# Patient Record
Sex: MALE | Race: BLACK OR AFRICAN AMERICAN | Employment: OTHER | URBAN - METROPOLITAN AREA
[De-identification: names, ages, dates, MRNs, and addresses within clinical notes are randomized per-mention and may not be internally consistent; named-entity substitution may affect disease eponyms.]

---

## 2018-01-05 ENCOUNTER — OFFICE VISIT (OUTPATIENT)
Dept: NEUROLOGY | Age: 78
End: 2018-01-05

## 2018-01-05 VITALS
BODY MASS INDEX: 32.93 KG/M2 | SYSTOLIC BLOOD PRESSURE: 150 MMHG | RESPIRATION RATE: 20 BRPM | WEIGHT: 230 LBS | DIASTOLIC BLOOD PRESSURE: 68 MMHG | HEIGHT: 70 IN

## 2018-01-05 DIAGNOSIS — R20.9 SENSORY DISTURBANCE: ICD-10-CM

## 2018-01-05 DIAGNOSIS — I69.30 ARTERIAL ISCHEMIC STROKE, VERTEBROBASILAR, THALAMIC, CHRONIC: Primary | ICD-10-CM

## 2018-01-05 DIAGNOSIS — R29.898 LEFT HAND WEAKNESS: ICD-10-CM

## 2018-01-05 DIAGNOSIS — R25.9 ABNORMAL INVOLUNTARY MOVEMENT: ICD-10-CM

## 2018-01-05 RX ORDER — OLMESARTAN MEDOXOMIL / AMLODIPINE BESYLATE / HYDROCHLOROTHIAZIDE 40; 10; 25 MG/1; MG/1; MG/1
TABLET, FILM COATED ORAL
Refills: 1 | COMMUNITY
Start: 2017-10-24

## 2018-01-05 RX ORDER — SIMVASTATIN 40 MG/1
TABLET, FILM COATED ORAL
COMMUNITY

## 2018-01-05 RX ORDER — TAMSULOSIN HYDROCHLORIDE 0.4 MG/1
0.4 CAPSULE ORAL DAILY
COMMUNITY

## 2018-01-05 RX ORDER — CLOPIDOGREL BISULFATE 75 MG/1
TABLET ORAL
COMMUNITY

## 2018-01-05 NOTE — PROGRESS NOTES
[address] [facility] [address]   [place] [address] [facility] [address] [place] [address] [address]    [place]   [phone] Fax             Referring: Dr. Ciro Goyal    Chief Complaint   Patient presents with   [facility] Tremors     18-year-old right-handed man who comes today for evaluation of what he calls acute stroke tremors twitching over entire body. Patient notes that he has had for several years now abnormal movements about his extremities that move up his body that occur randomly. After his stroke he became more concerned about these. He tells me that these occur every 2-3 days and that really is what brought him here for evaluation is that they typically would have been few and far between but now at least every 2-3 days he will get these abnormal twitching type movements that start in his legs and then move up his body and come over him. Sometimes he has a strains hard to describe sensation that occurs with them as well. He denies any rhythmic movement. Denies any loss or alteration in consciousness. Denies any version of the head. Denies lipsmacking or alteration in consciousness. He says these are quick and fast.  The not repetitive. They can happen several times in the day when they happen. He will try to get up and move around to see if it makes them go away. They do not seem to start right to left her bottom to top or top to bottom no rhyme or reason. He notes they just happen. In addition he says since the stroke he has had heaviness in the left upper extremity. He notes that the strength he believes is good in the arm but his hand he has difficulty doing fine motor movements. He also says that the entire upper extremity feels like he has led inside of it instead of blood. He is not dropping things of any degree. Again more difficult to do activities that require fine control.     Review of records that were kindly provided by Dr. Ciro Goyal finds he was hospitalized in September 2017 with stroke. This was said to be right thalamic. Echo demonstrated a PFO. His presentation was said to be left arm weakness tingling and left facial tingling. He was placed on aspirin Plavix and statin therapy. Review of the electronic medical record finds that the patient saw Dr. Gucci Colon for a neuropsychological evaluation back in 2013. At that time it was felt that he had perhaps mild cognitive impairment. He also had been seen by  who had performed an EMG of the upper extremities for complaints of paresthesia. Past Medical History:   Diagnosis Date    COPD [facility]chronic obstructive pulmonary disease) ([facility] [address]     Diabetes mellitus     Essential hypertension     Headache(784.0)     Sarcoid     Stroke ([facility] [address]        Past Surgical History:   Procedure Laterality Date    HX MOHS PROCEDURES      left       Current Outpatient Prescriptions   Medication Sig Dispense Refill    simvastatin (ZOCOR) 40 mg tablet Take  by mouth nightly.  clopidogrel (PLAVIX) 75 mg tab Take  by mouth.  tamsulosin (FLOMAX) 0.4 mg capsule Take 0.4 mg by mouth daily.  DOCOSAHEXANOIC ACID/EPA (FISH OIL PO) Take  by mouth.  TRIBENZOR 40-10-25 mg tab TAKE 1 TABLET BY MOUTH EVERY DAY  1    aspirin delayed-release 81 mg tablet Take  by mouth daily. No Known Allergies    Social History   Substance Use Topics    Smoking status: Never Smoker    Smokeless tobacco: Never Used    Alcohol use No       Family History   Problem Relation Age of Onset    Seizures Brother     Heart Disease Brother     Cancer Brother      x 2, prostate    Stroke Mother     Cancer Father        Review of Systems  Pertinent positives and negatives are as noted above. He also endorses some anxiety as well as some constipation and generalized fatigue. He denies snoring. He denies awakening himself from sleep gasping for breath. Denies pulsatile tinnitus.   Remainder of comprehensive systems review is negative    Examination  Visit Vitals    /68    Resp 20    Ht 5' 10\" (1.778 m)    Wt 104.3 kg (230 lb)    BMI 33 kg/m2     Pleasant, well appearing gentleman who is appropriately dressed and groomed. He is overweight. .  No icterus. Oropharynx clear. Supple neck without bruit. Heart regular. No edema. Neurologically he is awake alert oriented and conversant. He has normal speech and language. He discusses his medical history. He discusses his medications. He follows all commands. He has reactive pupils bilaterally. Disc margins not well seen. Full versions. No nystagmus. Symmetric face. Tongue and palate midline. Age-related paratonia. No abnormal movement noted today including no fasciculation. He has no pronation or drift. Age-related paratonia. He resists fully in the upper and lower extremities in all muscle groups to direct testing with the exception of fine motor movement/dexterity in the left hand decreased versus right. .  Reflexes are symmetrical today. No ataxia. Steady gait. Impression/Plan  Status post right thalamic stroke with left-sided sensory symptoms and some decreased dexterity in the left upper extremity as noted. We discussed stroke risk factors including hypertension dyslipidemia and discuss modifying such. Discussed losing weight and exercising etc.  Note his blood pressure today is elevated 150/68 and he notes that it is not generally that high. discussed been compliant with his medications. Continue to work with primary care to optimize his risk factors. He does note that he had a carotid Doppler done with his cardiologist recently as well. I would continue the Plavix and aspirin as well as a statin. For the decreased dexterity as well we will send him over to physical therapy    His bigger issue is that of these abnormal sensations and abnormal involuntary movements and we will send him for a routine EEG and then a 24 hour ambulatory EEG.   I like to look for epileptiform abnormalities. Like to try to capture what goes on when he has 1 of these events. He will return at the completion of his studies. Alex Becerra MD      This note was created using voice recognition software. Despite editing, there may be syntax errors. This note will not be viewable in [address]

## 2018-01-05 NOTE — MR AVS SNAPSHOT
Visit Information Date & Time Provider Department Dept. Phone Encounter #  
 1/5/2018  1:30 PM Monik Sanz MD [facility] [phone] [id number] Follow-up Instructions Return for After tests. Upcoming Health Maintenance Date Due DTaP/Tdap/Td series (1 - Tdap) 3/27/1962 ZOSTER VACCINE AGE 60> 1/27/2001 GLAUCOMA SCREENING Q2Y 3/27/2006 Pneumococcal 65+ Low/Medium Risk (1 of 2 - PCV13) 3/27/2006 MEDICARE YEARLY EXAM 3/27/2006 Influenza Age 5 to Adult 8/1/2017 Allergies as of 1/5/2018  Review Complete On: 1/5/2018 By: Monik Sanz MD  
 No Known Allergies Current Immunizations  Never Reviewed No immunizations on file. Not reviewed this visit You Were Diagnosed With   
  
 Codes Comments Arterial ischemic stroke, vertebrobasilar, thalamic, chronic    -  Primary ICD-10-CM: I69.30 ICD-9-CM: V12.54 Left hand weakness     ICD-10-CM: R29.898 ICD-9-CM: 728.87 Abnormal involuntary movement     ICD-10-CM: R25.9 ICD-9-CM: 781.0 Sensory disturbance     ICD-10-CM: R20.9 ICD-9-CM: 785. 0 Vitals BP Resp Height(growth percentile) Weight(growth percentile) BMI Smoking Status 150/68 20 5' 10\" (1.778 m) 230 lb (104.3 kg) 33 kg/m2 Never Smoker Vitals History BMI and BSA Data Body Mass Index Body Surface Area  
 33 kg/m 2 2.27 m 2 Your Updated Medication List  
  
   
This list is accurate as of: 1/5/18  2:16 PM.  Always use your most recent med list.  
  
  
  
  
 aspirin delayed-release 81 mg tablet Take  by mouth daily. clopidogrel 75 mg Tab Commonly known as:  PLAVIX Take  by mouth. FISH OIL PO Take  by mouth. FLOMAX 0.4 mg capsule Generic drug:  tamsulosin Take 0.4 mg by mouth daily. simvastatin 40 mg tablet Commonly known as:  ZOCOR Take  by mouth nightly. TRIBENZOR 40-10-25 mg Tab Generic drug:  Olmesartan-amLODIPine-HCTZ TAKE 1 TABLET BY MOUTH EVERY DAY We Performed the Following REFERRAL TO PHYSICAL THERAPY [LEJ54 Custom] Comments:  
 Post CVA right thalamus--has decreased fine motor/dexterity left UE eval and treat and give HEP Follow-up Instructions Return for After tests. To-Do List   
 01/05/2018 Neurology:  EEG   
  
 01/05/2018 Neurology:  NEURO EEG 24 HR Referral Information Referral ID Referred By Referred To  
  
 [id number] [facility], [address] Phone: [phone] Visits Status Start Date End Date 1 New Request 1/5/18 1/5/19 If your referral has a status of pending review or denied, additional information will be sent to support the outcome of this decision. Patient Instructions [address] What is a living will? A living will is a legal form you use to write down the kind of care you want at the end of your life. It is used by the health professionals who will treat you if you aren't able to decide for yourself. If you put your wishes in writing, your loved ones and others will know what kind of care you want. They won't need to guess. This can ease your mind and be helpful to others. A living will is not the same as an estate or property will. An estate will explains what you want to happen with your money and property after you die. Is a living will a legal document? A living will is a legal document. Each state has its own laws about living hernandez. If you move to another state, make sure that your living will is legal in the state where you now live. Or you might use a universal form that has been approved by many states. This kind of form can sometimes be completed and stored online. Your electronic copy will then be available wherever you have a connection to the Internet.  In most cases, doctors will respect your wishes even if you have a form from a different state. · You don't need an  to complete a living will. But legal advice can be helpful if your state's laws are unclear, your health history is complicated, or your family can't agree on what should be in your living will. · You can change your living will at any time. Some people find that their wishes about end-of-life care change as their health changes. · In addition to making a living will, think about completing a medical power of  form. This form lets you name the person you want to make end-of-life treatment decisions for you (your \"health care agent\") if you're not able to. Many hospitals and nursing homes will give you the forms you need to complete a living will and a medical power of . · Your living will is used only if you can't make or communicate decisions for yourself anymore. If you become able to make decisions again, you can accept or refuse any treatment, no matter what you wrote in your living will. · Your state may offer an online registry. This is a place where you can store your living will online so the doctors and nurses who need to treat you can find it right away. What should you think about when creating a living will? Talk about your end-of-life wishes with your family members and your doctor. Let them know what you want. That way the people making decisions for you won't be surprised by your choices. Think about these questions as you make your living will: · Do you know enough about life support methods that might be used? If not, talk to your doctor so you know what might be done if you can't breathe on your own, your heart stops, or you're unable to swallow. · What things would you still want to be able to do after you receive life-support methods? Would you want to be able to walk? To speak? To eat on your own? To live without the help of machines? · If you have a choice, where do you want to be cared for? In your home? At a hospital or nursing home? · Do you want certain Zoroastrianism practices performed if you become very ill? · If you have a choice at the end of your life, where would you prefer to die? At home? In a hospital or nursing home? Somewhere else? · Would you prefer to be buried or cremated? · Do you want your organs to be donated after you die? What should you do with your living will? · Make sure that your family members and your health care agent have copies of your living will. · Give your doctor a copy of your living will to keep in your medical record. If you have more than one doctor, make sure that each one has a copy. · You may want to put a copy of your living will where it can be easily found. Where can you learn more? Go to [email or web address]-anna [email or web address].info/. Enter C626 in the search box to learn more about \"Learning About Living Miriam. \" Current as of: September 24, 2016 Content Version: 11.4 © 9764-5832 [employer]. Care instructions adapted under license by [employer] (which disclaims liability or warranty for this information). If you have questions about a medical condition or this instruction, always ask your healthcare professional. [address] any warranty or liability for your use of this information. Advance Directives: Care Instructions Your Care Instructions An advance directive is a legal way to state your wishes at the end of your life. It tells your family and your doctor what to do if you can no longer say what you want. There are two main types of advance directives. You can change them any time that your wishes change. · A living will tells your family and your doctor your wishes about life support and other treatment.  
· A durable power of  for health care lets you name a person to make treatment decisions for you when you can't speak for yourself. This person is called a health care agent. If you do not have an advance directive, decisions about your medical care may be made by a doctor or a  who doesn't know you. It may help to think of an advance directive as a gift to the people who care for you. If you have one, they won't have to make tough decisions by themselves. Follow-up care is a key part of your treatment and safety. Be sure to make and go to all appointments, and call your doctor if you are having problems. It's also a good idea to know your test results and keep a list of the medicines you take. How can you care for yourself at home? · Discuss your wishes with your loved ones and your doctor. This way, there are no surprises. · Many states have a unique form. Or you might use a universal form that has been approved by many states. This kind of form can sometimes be completed and stored online. Your electronic copy will then be available wherever you have a connection to the Internet. In most cases, doctors will respect your wishes even if you have a form from a different state. · You don't need a  to do an advance directive. But you may want to get legal advice. · Think about these questions when you prepare an advance directive: ¨ Who do you want to make decisions about your medical care if you are not able to? Many people choose a family member or close friend. ¨ Do you know enough about life support methods that might be used? If not, talk to your doctor so you understand. ¨ What are you most afraid of that might happen? You might be afraid of having pain, losing your independence, or being kept alive by machines. ¨ Where would you prefer to die? Choices include your home, a hospital, or a nursing home. ¨ Would you like to have information about hospice care to support you and your family? ¨ Do you want to donate organs when you die? ¨ Do you want certain Moravian practices performed before you die? If so, put your wishes in the advance directive. · Read your advance directive every year, and make changes as needed. When should you call for help? Be sure to contact your doctor if you have any questions. Where can you learn more? Go to http://johann-anna [email or web address].info/. Enter R264 in the search box to learn more about \"Advance Directives: Care Instructions. \" Current as of: September 24, 2016 Content Version: 11.4 © 0360-3000 [employer]. Care instructions adapted under license by [employer] (which disclaims liability or warranty for this information). If you have questions about a medical condition or this instruction, always ask your healthcare professional. [address] any warranty or liability for your use of this information. PRESCRIPTION REFILL POLICY [facility] Statement to Patients April 1, 2014 In an effort to ensure the large volume of patient prescription refills is processed in the most efficient and expeditious manner, we are asking our patients to assist us by calling your Pharmacy for all prescription refills, this will include also your  Mail Order Pharmacy. The pharmacy will contact our office electronically to continue the refill process. Please do not wait until the last minute to call your pharmacy. We need at least 48 hours (2days) to fill prescriptions. We also encourage you to call your pharmacy before going to  your prescription to make sure it is ready. With regard to controlled substance prescription refill requests (narcotic refills) that need to be picked up at our office, we ask your cooperation by providing us with at least 72 hours (3days) notice that you will need a refill.  
 
We will not refill narcotic prescription refill requests after 4:00pm on any weekday, Monday through Thursday, or after 2:00pm on Fridays, or on the weekends. We encourage everyone to explore another way of getting your prescription refill request processed using Acrinta, our patient web portal through our electronic medical record system. Qumulot is an efficient and effective way to communicate your medication request directly to the office and  downloadable as an tanisha on your smart phone . Acrinta also features a review functionality that allows you to view your medication list as well as leave messages for your physician. Are you ready to get connected? If so please review the attatched instructions or speak to any of our staff to get you set up right away! Thank you so much for your cooperation. Should you have any questions please contact our Practice Administrator. The Physicians and Staff,  [facility] Introducing [facility]! [facility] introduces Acrinta patient portal. Now you can access parts of your medical record, email your doctor's office, and request medication refills online. 1. In your internet browser, go to [email or web address] 2. Click on the First Time User? Click Here link in the Sign In box. You will see the New Member Sign Up page. 3. Enter your Acrinta Access Code exactly as it appears below. You will not need to use this code after youve completed the sign-up process. If you do not sign up before the expiration date, you must request a new code. · Acrinta Access Code: [email or web address] Expires: 4/5/2018  2:16 PM 
 
4. Enter the last four digits of your Social Security Number (xxxx) and Date of Birth (mm/dd/yyyy) as indicated and click Submit. You will be taken to the next sign-up page. 5. Create a Acrinta ID. This will be your Acrinta login ID and cannot be changed, so think of one that is secure and easy to remember. 6. Create a Symetis password. You can change your password at any time. 7. Enter your Password Reset Question and Answer. This can be used at a later time if you forget your password. 8. Enter your e-mail address. You will receive e-mail notification when new information is available in [address] 9. Click Sign Up. You can now view and download portions of your medical record. 10. Click the Download Summary menu link to download a portable copy of your medical information. If you have questions, please visit the Frequently Asked Questions section of the Symetis website. Remember, Symetis is NOT to be used for urgent needs. For medical emergencies, dial 911. Now available from your iPhone and Android! Please provide this summary of care documentation to your next provider. Your primary care clinician is listed as Ramírez Guard. If you have any questions after today's visit, please call [phone].

## 2018-01-05 NOTE — PATIENT INSTRUCTIONS
Learning About Living Axel Neither  What is a living will? A living will is a legal form you use to write down the kind of care you want at the end of your life. It is used by the health professionals who will treat you if you aren't able to decide for yourself. If you put your wishes in writing, your loved ones and others will know what kind of care you want. They won't need to guess. This can ease your mind and be helpful to others. A living will is not the same as an estate or property will. An estate will explains what you want to happen with your money and property after you die. Is a living will a legal document? A living will is a legal document. Each state has its own laws about living hernandez. If you move to another state, make sure that your living will is legal in the state where you now live. Or you might use a universal form that has been approved by many states. This kind of form can sometimes be completed and stored online. Your electronic copy will then be available wherever you have a connection to the Internet. In most cases, doctors will respect your wishes even if you have a form from a different state. · You don't need an  to complete a living will. But legal advice can be helpful if your state's laws are unclear, your health history is complicated, or your family can't agree on what should be in your living will. · You can change your living will at any time. Some people find that their wishes about end-of-life care change as their health changes. · In addition to making a living will, think about completing a medical power of  form. This form lets you name the person you want to make end-of-life treatment decisions for you (your \"health care agent\") if you're not able to. Many hospitals and nursing homes will give you the forms you need to complete a living will and a medical power of .   · Your living will is used only if you can't make or communicate decisions for yourself anymore. If you become able to make decisions again, you can accept or refuse any treatment, no matter what you wrote in your living will. · Your state may offer an online registry. This is a place where you can store your living will online so the doctors and nurses who need to treat you can find it right away. What should you think about when creating a living will? Talk about your end-of-life wishes with your family members and your doctor. Let them know what you want. That way the people making decisions for you won't be surprised by your choices. Think about these questions as you make your living will:  · Do you know enough about life support methods that might be used? If not, talk to your doctor so you know what might be done if you can't breathe on your own, your heart stops, or you're unable to swallow. · What things would you still want to be able to do after you receive life-support methods? Would you want to be able to walk? To speak? To eat on your own? To live without the help of machines? · If you have a choice, where do you want to be cared for? In your home? At a hospital or nursing home? · Do you want certain Hoahaoism practices performed if you become very ill? · If you have a choice at the end of your life, where would you prefer to die? At home? In a hospital or nursing home? Somewhere else? · Would you prefer to be buried or cremated? · Do you want your organs to be donated after you die? What should you do with your living will? · Make sure that your family members and your health care agent have copies of your living will. · Give your doctor a copy of your living will to keep in your medical record. If you have more than one doctor, make sure that each one has a copy. · You may want to put a copy of your living will where it can be easily found. Where can you learn more? Go to http://johann-anna marie.info/.   Enter U925 in the search box to learn more about \"Learning About Living Laila Engel. \"  Current as of: September 24, 2016  Content Version: 11.4  © 4514-6563 [employer]. Care instructions adapted under license by [employer] (which disclaims liability or warranty for this information). If you have questions about a medical condition or this instruction, always ask your healthcare professional. [address] any warranty or liability for your use of this information. Advance Directives: Care Instructions  Your Care Instructions  An advance directive is a legal way to state your wishes at the end of your life. It tells your family and your doctor what to do if you can no longer say what you want. There are two main types of advance directives. You can change them any time that your wishes change. · A living will tells your family and your doctor your wishes about life support and other treatment. · A durable power of  for health care lets you name a person to make treatment decisions for you when you can't speak for yourself. This person is called a health care agent. If you do not have an advance directive, decisions about your medical care may be made by a doctor or a  who doesn't know you. It may help to think of an advance directive as a gift to the people who care for you. If you have one, they won't have to make tough decisions by themselves. Follow-up care is a key part of your treatment and safety. Be sure to make and go to all appointments, and call your doctor if you are having problems. It's also a good idea to know your test results and keep a list of the medicines you take. How can you care for yourself at home? · Discuss your wishes with your loved ones and your doctor. This way, there are no surprises. · Many states have a unique form. Or you might use a universal form that has been approved by many states. This kind of form can sometimes be completed and stored online.  Your electronic copy will then be available wherever you have a connection to the Internet. In most cases, doctors will respect your wishes even if you have a form from a different state. · You don't need a  to do an advance directive. But you may want to get legal advice. · Think about these questions when you prepare an advance directive:  ¨ Who do you want to make decisions about your medical care if you are not able to? Many people choose a family member or close friend. ¨ Do you know enough about life support methods that might be used? If not, talk to your doctor so you understand. ¨ What are you most afraid of that might happen? You might be afraid of having pain, losing your independence, or being kept alive by machines. ¨ Where would you prefer to die? Choices include your home, a hospital, or a nursing home. ¨ Would you like to have information about hospice care to support you and your family? ¨ Do you want to donate organs when you die? ¨ Do you want certain Yazidism practices performed before you die? If so, put your wishes in the advance directive. · Read your advance directive every year, and make changes as needed. When should you call for help? Be sure to contact your doctor if you have any questions. Where can you learn more? Go to http://johann-anna marie.info/. Enter R264 in the search box to learn more about \"Advance Directives: Care Instructions. \"  Current as of: September 24, 2016  Content Version: 11.4  © 0317-8220 [employer]. Care instructions adapted under license by [employer] (which disclaims liability or warranty for this information). If you have questions about a medical condition or this instruction, always ask your healthcare professional. [place] [address] any warranty or liability for your use of this information.   [facility]   Statement to Patients  April 1, 2014      In an effort to ensure the large volume of patient prescription refills is processed in the most efficient and expeditious manner, we are asking our patients to assist us by calling your Pharmacy for all prescription refills, this will include also your  Mail Order Pharmacy. The pharmacy will contact our office electronically to continue the refill process. Please do not wait until the last minute to call your pharmacy. We need at least 48 hours (2days) to fill prescriptions. We also encourage you to call your pharmacy before going to  your prescription to make sure it is ready. With regard to controlled substance prescription refill requests (narcotic refills) that need to be picked up at our office, we ask your cooperation by providing us with at least 72 hours (3days) notice that you will need a refill. We will not refill narcotic prescription refill requests after 4:00pm on any weekday, Monday through Thursday, or after 2:00pm on Fridays, or on the weekends. We encourage everyone to explore another way of getting your prescription refill request processed using [employer], our patient web portal through our electronic medical record system. [employer] is an efficient and effective way to communicate your medication request directly to the office and  downloadable as an tanisha on your smart phone . [employer] also features a review functionality that allows you to view your medication list as well as leave messages for your physician. Are you ready to get connected? If so please review the attatched instructions or speak to any of our staff to get you set up right away! Thank you so much for your cooperation. Should you have any questions please contact our Practice Administrator.     The Physicians and Staff,  [address] [place] [address]

## 2018-01-10 ENCOUNTER — HOSPITAL ENCOUNTER (OUTPATIENT)
Dept: NEUROLOGY | Age: 78
Discharge: HOME | End: 2018-01-10
Attending: PSYCHIATRY & NEUROLOGY
Payer: MEDICARE

## 2018-01-10 DIAGNOSIS — R25.9 ABNORMAL INVOLUNTARY MOVEMENT: ICD-10-CM

## 2018-01-10 DIAGNOSIS — I69.30 ARTERIAL ISCHEMIC STROKE, VERTEBROBASILAR, THALAMIC, CHRONIC: ICD-10-CM

## 2018-01-10 DIAGNOSIS — R20.9 SENSORY DISTURBANCE: ICD-10-CM

## 2018-01-10 DIAGNOSIS — R29.898 LEFT HAND WEAKNESS: ICD-10-CM

## 2018-01-10 PROCEDURE — 95816 EEG AWAKE AND DROWSY: CPT

## 2018-01-11 ENCOUNTER — HOSPITAL ENCOUNTER (OUTPATIENT)
Dept: NEUROLOGY | Age: 78
Discharge: HOME | End: 2018-01-11
Attending: PSYCHIATRY & NEUROLOGY
Payer: MEDICARE

## 2018-01-11 DIAGNOSIS — R20.9 SENSORY DISTURBANCE: ICD-10-CM

## 2018-01-11 DIAGNOSIS — R25.9 ABNORMAL INVOLUNTARY MOVEMENT: ICD-10-CM

## 2018-01-11 DIAGNOSIS — I69.30 ARTERIAL ISCHEMIC STROKE, VERTEBROBASILAR, THALAMIC, CHRONIC: ICD-10-CM

## 2018-01-11 DIAGNOSIS — R29.898 LEFT HAND WEAKNESS: ICD-10-CM

## 2018-01-11 PROCEDURE — 95953 NEURO EEG 24 HR: CPT

## 2018-01-17 NOTE — PROCEDURES
[facility] EEG REPORT    Prieto Sandra  MR#: [id number]  : 1940  ACCOUNT #: [de-identified]   DATE OF SERVICE: 01/10/2018    REQUESTING PHYSICIAN: Inga Michel MD    INDICATIONS FOR PROCEDURE:  An EEG is requested in this 61-year-old with abnormal movements to evaluate for epileptiform abnormalities. MEDICATIONS  Listed as:  1. Plavix. 2.  Zocor. 3. Flomax. FINDINGS: This tracing is obtained during the awake state. During wakefulness, there are intermittent runs of posteriorly dominant and symmetrical low to medium amplitude 9-10 cycle per second activities, which attenuate with eye opening. Lower voltage faster frequency activities are seen symmetrically over the anterior head regions. Hyperventilation not performed secondary to age and medical history. Photic stimulation little alters the tracing. Sleep is not attained. INTERPRETATION:  This EEG recorded during the awake state is normal.  No epileptiform abnormalities are seen.       Elizabeth Dunn MD       SLE / [employer]  D: 2018 12:00     T: 2018 12:28  JOB #: [id number]

## 2018-01-18 ENCOUNTER — HOSPITAL ENCOUNTER (OUTPATIENT)
Dept: PHYSICAL THERAPY | Age: 78
Discharge: HOME | End: 2018-01-18
Payer: MEDICARE

## 2018-01-18 DIAGNOSIS — I69.30 ARTERIAL ISCHEMIC STROKE, VERTEBROBASILAR, THALAMIC, CHRONIC: ICD-10-CM

## 2018-01-18 DIAGNOSIS — R29.898 LEFT HAND WEAKNESS: ICD-10-CM

## 2018-01-18 DIAGNOSIS — R20.9 SENSORY DISTURBANCE: ICD-10-CM

## 2018-01-18 DIAGNOSIS — R25.9 ABNORMAL INVOLUNTARY MOVEMENT: ICD-10-CM

## 2018-01-18 PROCEDURE — G8984 CARRY CURRENT STATUS: HCPCS | Performed by: PHYSICAL THERAPIST

## 2018-01-18 PROCEDURE — 97162 PT EVAL MOD COMPLEX 30 MIN: CPT | Performed by: PHYSICAL THERAPIST

## 2018-01-18 PROCEDURE — 97110 THERAPEUTIC EXERCISES: CPT | Performed by: PHYSICAL THERAPIST

## 2018-01-18 PROCEDURE — G8985 CARRY GOAL STATUS: HCPCS | Performed by: PHYSICAL THERAPIST

## 2018-01-18 NOTE — PROGRESS NOTES
PT INITIAL EVALUATION NOTE - [facility] 2-15    Patient Name: Patricia Lopez  Date:2018  : 1940  [x]  Patient  Verified  Payor: Don Gottron / Plan: [place] MEDICARE PART A & B / Product Type: Medicare /    In time:11:30 PM  Out time:12:30 PM  Total Treatment Time (min): 60  Total Timed Codes (min): 40  1:1 Treatment Time ( [address] only): 60   Visit #: 1     Treatment Area: Arterial ischemic stroke, vertebrobasilar, thalamic, chronic [I69.30]  Left hand weakness [R29.898]  Abnormal involuntary movement [R25.9]  Sensory disturbance [R20.9]    SUBJECTIVE  Pain Level (0-10 scale): 0  Any medication changes, allergies to medications, adverse drug reactions, diagnosis change, or new procedure performed?: [] No    [x] Yes (see summary sheet for update)  Subjective:    L hand, UE weakness post-CVA  PLOF: no limitations with grasping objects, ADL's  Mechanism of Injury: Patient suffered a right thalamic CVA in 2017. He had a 3 day acute care stay and then was discharged to home. He f/u with a hand therapist several weeks later, but stopped going because \"they just massaged my arm\". He continues to complain of left facial numbness, L UE numbness, and hand weakness. He has difficulty with tasks involving  strength and was referred to this location by Dr. Renetta Inman. Previous Treatment/Compliance: He has tried strengthening at home with gym equipment, but feels unsure he is doing the right thing. PMHx/Surgical Hx: DM, heart disease, HTN  Work Hx: retired, worked previously as a [employer]   Living Situation: lives at home with wife  Pt Goals: to improve  strength  Barriers: chronicity  Motivation: very motivated  Substance use: low  FABQ Score: n/a  Cognition: A & O x 3        OBJECTIVE/EXAMINATION    UE coordination testing:  All tests WNL with eyes open, mild difficulty with eyes closed    Strength: MMT  Right Left   Shoulder Flex 5/5 4/5    Ext /5 /5    abd 5/5 4/5    Horizontal add /5 /5    IR /5 /5 ER /5 /5   Elbow Ext/flex 5/5 4/5   Forearm Supination 5/5 4/5    Pronation 5/5 4/5   Wrist Flex 5/5 4/5    Ext 5/5 4/5    Ulnar Dev 5/5 4/5    Radial Dev 5/5 4/5     Hand AROM: WNL    Hand Strength: Gross Grasp 3pt Pinch Lateral Pinch Tip Pinch   Right  58 26     Left 26 13             40 min Therapeutic Exercise:  [x] See flow sheet :   Rationale: increase strength and improve coordination to improve the patients ability to grasp objects without pain          With   [] TE   [] TA   [] neuro   [] other: Patient Education: [x] Review HEP    [] Progressed/Changed HEP based on:   [] positioning   [] body mechanics   [] transfers   [] heat/ice application    [] other:      Other Objective/Functional Measures:    Pain Level (0-10 scale) post treatment: 0    ASSESSMENT/Changes in Function:     [x]  See Plan of Ian Ricks, PT , DPT, OCS, Cert.  KHALIDA   1/18/2018  12:42 PM

## 2018-01-18 NOTE — PROGRESS NOTES
[address] [place], [place] [address]  Phone: [phone]  Fax: [phone]    Plan of Care/Statement of Necessity for Physical Therapy Services  2-15    Patient name: Ivory Vergara  : 1940  Provider#: [id number]  Referral source: Nicole Coon MD      Medical/Treatment Diagnosis: Arterial ischemic stroke, vertebrobasilar, thalamic, chronic [I69.30]  Left hand weakness [R29.898]  Abnormal involuntary movement [R25.9]  Sensory disturbance [R20.9]     Prior Hospitalization: see medical history     Comorbidities: DM, HTN, CVD  Prior Level of Function: see initial eval  Medications: Verified on Patient Summary List  Start of Care: 18      Onset Date: 2017   The [address] [place] [address] and following information is based on the information from the initial evaluation. Assessment/ key information: Patient presents with L UE numbness and hand weakness following a CVA he suffered 4 months ago. Today he presented with significant weakness throughout the muscular of the L UE and should benefit from a general strengthening program focused on the hand. Evaluation Complexity History MEDIUM  Complexity : 1-2 comorbidities / personal factors will impact the outcome/ POC ; Examination MEDIUM Complexity : 3 Standardized tests and measures addressing body structure, function, activity limitation and / or participation in recreation  ;Presentation MEDIUM Complexity : Evolving with changing characteristics  ; Clinical Decision Making MEDIUM Complexity : FOTO score of 26-74  Overall Complexity Rating: MEDIUM    Problem List: pain affecting function, decrease ROM, decrease strength, decrease ADL/ functional abilitiies, decrease activity tolerance and decrease flexibility/ joint mobility   Treatment Plan may include any combination of the following: Therapeutic exercise, Therapeutic activities, Neuromuscular re-education, Physical agent/modality, Manual therapy, Patient education and Self Care training  Patient / Family readiness to learn indicated by: asking questions, trying to perform skills and interest  Persons(s) to be included in education: patient (P)  Barriers to Learning/Limitations: None  Patient Goal (s): I want to be able to  objects without letting them slip through my grasp.   Patient Self Reported Health Status: excellent  Rehabilitation Potential: excellent    Short Term Goals: To be accomplished in 8 treatments:  1. Patient will improve general grasp strength by 10# to allow for improved ability to carry a heavy bag of groceries. 2. Patient will improve 3 point pinch strength by 5# to allow for improved ability to  his keys. 3. Patient will be able to carry a 20# object in his left hand for 50 ft. Long Term Goals: To be accomplished in 16 treatments:  1. Patient will be able to lift 5# over his head with the L UE.  2. Patient will be able to open a jar with the L hand with no limitation. 3. Patient will be able to carry a 30# objects in his left hand for 50 ft. Frequency / Duration: Patient to be seen 2 times per week for 8 weeks. Patient/ Caregiver education and instruction: self care, activity modification and exercises    [x]  Plan of care has been reviewed with PTA    G-Codes (GP)  Carry   Current  CK= 40-59%    Goal  CJ= 20-39%    The severity rating is based on clinical judgment and the FOTO Score score. Certification Period: 1/18/18 - 4/18/18  Han Lundy, PT , DPT, OCS, Cert. DN   1/18/2018 12:52 PM    ________________________________________________________________________    I certify that the above Therapy Services are being furnished while the patient is under my care. I agree with the treatment plan and certify that this therapy is necessary.     [de-identified] Signature:____________________  Date:____________Time: _________

## 2018-01-22 NOTE — PROCEDURES
[facility] [address] EEG REPORT    Justa Venegas  MR#: [id number]  : 1940  ACCOUNT #: [de-identified]   DATE OF SERVICE: 2018    REQUESTING PHYSICIAN:  Dr. Yady Zamora. PROCEDURE:  A 24-hour ambulatory EEG is requested on this 81-year-old man with paroxysms of abnormal involuntary movements, status post stroke, to evaluate for epileptiform abnormalities and to capture said movements for accurate diagnosis. MEDICATIONS:  Listed as:  Zocor, Plavix, Flomax, aspirin. This tracing has a recording start time of 1100 hours 2018, recording end time of 0924 hours on 2018 for a total recording time of 22 hours 24 minutes. A patient diary accompanies the tracing. Computerized spike and seizure detection software is utilized. During wakefulness, there are brief intermittent runs of posteriorly dominant and symmetric low to medium amplitude 9 cycle per second activities, which attenuate with eye opening. Lower voltage faster frequency activities are seen symmetrically over the anterior head regions. Normal sleep architecture is seen. Review of patient diary finds no clinical events of interest.      Review of computerized spike and seizure detection software reveals no spikes, no seizures. INTERPRETATION:  This 24-hour ambulatory EEG is normal.  No clinical events of interest captured. No epileptiform abnormality seen. Should the patient continue to have clinical events of suspicion for seizure, consideration should be given for intensive telemetry EEG with simultaneous video monitoring such as in the epilepsy monitoring unit.       MD KHURRAM Camarena / JALEESA  D: 2018 11:24     T: 2018 13:41  JOB #: [id number]

## 2018-01-25 ENCOUNTER — HOSPITAL ENCOUNTER (OUTPATIENT)
Dept: PHYSICAL THERAPY | Age: 78
Discharge: HOME | End: 2018-01-25
Payer: MEDICARE

## 2018-01-25 PROCEDURE — 97110 THERAPEUTIC EXERCISES: CPT

## 2018-01-25 PROCEDURE — 97112 NEUROMUSCULAR REEDUCATION: CPT

## 2018-01-25 NOTE — PROGRESS NOTES
PT DAILY TREATMENT NOTE - [facility] 2-15    Patient Name: Tr Sims  Date:2018  : 1940  [x]  Patient  Verified  Payor: Sussy Bravo / Plan: [place] MEDICARE PART A & B / Product Type: Medicare /    In time:11:00a  Out time:11:55a  Total Treatment Time (min): 55  Total Timed Codes (min): 55  1:1 Treatment Time ( [address] only): 39   Visit #: 2     Treatment Area: Unspecified sequelae of cerebral infarction [I69.30]  Other symptoms and signs involving the musculoskeletal system [R29.898]  Unspecified abnormal involuntary movements [R25.9]  Unspecified disturbances of skin sensation [R20.9]    SUBJECTIVE  Pain Level (0-10 scale): 0  Any medication changes, allergies to medications, adverse drug reactions, diagnosis change, or new procedure performed?: [x] No    [] Yes (see summary sheet for update)  Subjective functional status/changes:   [] No changes reported  Patient reports he feels a little more fatigued today and a little SOB. Patient states he continues to feel numbness along the left side of his face that has not changed.     OBJECTIVE    40 min Therapeutic Exercise:  [x] See flow sheet :   Rationale: increase ROM, increase strength, improve coordination and increase proprioception to improve the patients ability to grasp objects, complete ADL's    15 min Neuromuscular Re-education:  [x]  See flow sheet :   Rationale: increase ROM, increase strength, improve coordination and increase proprioception  to improve the patients ability to grasp objects, complete ADL's    With   [] TE   [] TA   [] neuro   [] other: Patient Education: [x] Review HEP    [] Progressed/Changed HEP based on:   [] positioning   [] body mechanics   [] transfers   [] heat/ice application    [] other:      Other Objective/Functional Measures:  Pt given sensory facial exercises for HEP, pt instructed to concentrate on finger opposition for the fifth digit     Pain Level (0-10 scale) post treatment: 0    ASSESSMENT/Changes in Function: Patient will continue to benefit from skilled PT services to modify and progress therapeutic interventions, address functional mobility deficits, address ROM deficits, address strength deficits, analyze and address soft tissue restrictions and analyze and cue movement patterns to attain remaining goals. []  See Plan of Care  []  See progress note/recertification  []  See Discharge Summary         Progress towards goals / Updated goals:  Patient able to tolerate all interventions and was able to advance several with no pain throughout. Patient required verbal cues to ensure proper muscle activation without compensations.     PLAN  [x]  Upgrade activities as tolerated     [x]  Continue plan of care  [x]  Update interventions per flow sheet       []  Discharge due to:_  []  Other:_      Smione Alex 1/25/2018  11:22 AM

## 2018-01-30 ENCOUNTER — HOSPITAL ENCOUNTER (OUTPATIENT)
Dept: PHYSICAL THERAPY | Age: 78
End: 2018-01-30
Payer: MEDICARE

## 2018-01-31 ENCOUNTER — OFFICE VISIT (OUTPATIENT)
Dept: NEUROLOGY | Age: 78
End: 2018-01-31

## 2018-01-31 ENCOUNTER — HOSPITAL ENCOUNTER (OUTPATIENT)
Dept: PHYSICAL THERAPY | Age: 78
Discharge: HOME | End: 2018-01-31
Payer: MEDICARE

## 2018-01-31 VITALS
HEIGHT: 70 IN | DIASTOLIC BLOOD PRESSURE: 70 MMHG | SYSTOLIC BLOOD PRESSURE: 130 MMHG | OXYGEN SATURATION: 96 % | WEIGHT: 231 LBS | RESPIRATION RATE: 20 BRPM | HEART RATE: 66 BPM | BODY MASS INDEX: 33.07 KG/M2

## 2018-01-31 DIAGNOSIS — R20.0 LEFT SIDED NUMBNESS: ICD-10-CM

## 2018-01-31 DIAGNOSIS — M48.02 CERVICAL STENOSIS OF SPINAL CANAL: ICD-10-CM

## 2018-01-31 DIAGNOSIS — R25.9 ABNORMAL INVOLUNTARY MOVEMENT: Primary | ICD-10-CM

## 2018-01-31 PROCEDURE — 97110 THERAPEUTIC EXERCISES: CPT | Performed by: PHYSICAL THERAPIST

## 2018-01-31 NOTE — PROGRESS NOTES
Date:  18     Name:  Pernell Dinh  :  1940  MRN:  [id number]     PCP:  Pepe Denney MD    Chief Complaint   Patient presents with    Results     HISTORY OF PRESENT ILLNESS: Follow up for abnormal movements after routine and 24 hour EEG. Both tests were normal. He indicates that he will have abnormal movements that are sporadic. It has occurred for years but it was inconsistent and not often. At this point, it seems to happen once a week. They are short episodes lasting only seconds. It is random up and down the body. It is something he can feel not something that he can see. He indicates that it is not something that he is able to trigger and nothing seems to make it better. Except as noted above, denies  fever, chills, cough. No CP or SOB. No dysuria, loss of bowel or bladder control. No Weight loss. Appetite good. Sleeping well. No sweats. No edema. No bruising or bleeding. No nausea or vomit. No diarrhea. No frequency, urgency, No depressive sxs. No anxiety. Denies sore throat, nasal congestion, nasal discharge, epistaxis, tinnitus, hearing loss, back pain, muscle pain, or joint pain. Current Outpatient Prescriptions   Medication Sig    simvastatin (ZOCOR) 40 mg tablet Take  by mouth nightly.  clopidogrel (PLAVIX) 75 mg tab Take  by mouth.  tamsulosin (FLOMAX) 0.4 mg capsule Take 0.4 mg by mouth daily.  DOCOSAHEXANOIC ACID/EPA (FISH OIL PO) Take  by mouth.  TRIBENZOR 40-10-25 mg tab TAKE 1 TABLET BY MOUTH EVERY DAY    aspirin delayed-release 81 mg tablet Take  by mouth daily. No current facility-administered medications for this visit.       No Known Allergies  Past Medical History:   Diagnosis Date    [facility] [address]     Diabetes mellitus     Essential hypertension     Headache(784.0)     Sarcoid     Stroke ([facility] [address]      Past Surgical History:   Procedure Laterality Date    HX MOHS PROCEDURES      left Social History     Social History    Marital status: SINGLE     Spouse name: N/A    Number of children: N/A    Years of education: N/A     Occupational History    Not on file. Social History Main Topics    Smoking status: Never Smoker    Smokeless tobacco: Never Used    Alcohol use No    Drug use: No    Sexual activity: Not on file     Other Topics Concern    Not on file     Social History Narrative     Family History   Problem Relation Age of Onset    Seizures Brother     Heart Disease Brother     Cancer Brother      x 2, prostate    Stroke Mother     Cancer Father        PHYSICAL EXAMINATION:    Visit Vitals    /70    Pulse 66    Resp 20    Ht 5' 10\" (1.778 m)    Wt 104.8 kg (231 lb)    SpO2 96%    BMI 33.15 kg/m2     General:  Well defined, nourished, and groomed individual in no acute distress. Neck: Supple, nontender, no bruits, no pain with resistance to active range of motion. Heart: Regular rate and rhythm, no murmurs, rub, or gallop. Normal S1S2. Lungs:  Clear to auscultation bilaterally with equal chest expansion, no cough, no wheeze  Musculoskeletal:  Extremities revealed no edema and had full range of motion of joints. Psych:  Good mood and bright affect    NEUROLOGICAL EXAMINATION:     Mental Status:   Alert and oriented to person, place, and time with recent and remote memory intact. Attention span and concentration are normal. Speech is fluent with a full fund of knowledge. Cranial Nerves:    II, III, IV, VI:  Visual acuity grossly intact. Visual fields are normal.    Pupils are equal, round, and reactive to light and accommodation. Extra-ocular movements are full and fluid. Fundoscopic exam was benign, no ptosis or nystagmus. V-XII: Hearing is grossly intact. Facial features are symmetric, with normal sensation and strength. The palate rises symmetrically and the tongue protrudes midline. Sternocleidomastoids 5/5.       Motor Examination: Normal tone, bulk, and strength, 5/5 muscle strength throughout. Coordination:  Finger to nose was normal.   No resting or intention tremor    Gait and Station:  Steady while walking. Normal arm swing. No pronator drift. No muscle wasting or fasiculations noted. Reflexes:  DTRs 2+ throughout. ASSESSMENT AND PLAN    ICD-10-CM ICD-9-CM    1. Abnormal involuntary movement R25.9 781.0 REFERRAL TO NEUROLOGY      MRI CERV SPINE WO CONT   2. Left sided numbness R20.0 782.0    3. Cervical stenosis of spinal canal M48.02 723.0 MRI CERV SPINE WO CONT     Mr. Rocky Parker presented today for follow-up evaluation of abnormal involuntary movements. 24 hour EEG and routine EEG were both normal.  This does continue to occur approximately 1 time per week. Recommended he be evaluated in the epilepsy monitoring unit in hopes to capture an event for better identification. We also discussed potential for possible spinal involvement particularly given his previous evaluation back in 2012 demonstrating a central canal stenosis with mild cord compression. We will reassess this with MRI of the cervical spine. Discussed secondary stroke prevention as well as signs and symptoms of stroke, BE-FAST, and when to call for EMS. Stressed importance of recognition and early intervention. LDL goal less than 70 per secondary stroke guidelines. Continue with Plavix for secondary stroke prevention. Discussed lifestyle modification and importance of exercise and appropriate diet, weight management, and management of comorbid disease with appropriate follow up visits with primary care and other healthcare providers. Follow-up after testing    Alejandra Chowdhury    More than 50% of today's 40+ minute office visit was spent in education counseling

## 2018-01-31 NOTE — PROGRESS NOTES
Test Results:    24 Hour EEG  REEG     Physical therapy- still doing physical therapy twice a week- has been going okay he stated he hadn't really noticed any change

## 2018-01-31 NOTE — MR AVS SNAPSHOT
[address] [facility] [address] [facility] [address] [phone] Patient: Roderick Izaguirre MRN: [id number] PIX:1/48/1480 Visit Information Date & Time Provider Department Dept. Phone Encounter #  
 1/31/2018  3:00 [facility] [phone] [id number] Upcoming Health Maintenance Date Due DTaP/Tdap/Td series (1 - Tdap) 3/27/1962 ZOSTER VACCINE AGE 60> 1/27/2001 GLAUCOMA SCREENING Q2Y 3/27/2006 Pneumococcal 65+ Low/Medium Risk (1 of 2 - PCV13) 3/27/2006 MEDICARE YEARLY EXAM 3/27/2006 Influenza Age 5 to Adult 8/1/2017 Allergies as of 1/31/2018  Review Complete On: 1/31/2018 By: Luis Morales NP No Known Allergies Current Immunizations  Never Reviewed No immunizations on file. Not reviewed this visit You Were Diagnosed With   
  
 Codes Comments Abnormal involuntary movement    -  Primary ICD-10-CM: R25.9 ICD-9-CM: 781.0 Left sided numbness     ICD-10-CM: R20.0 ICD-9-CM: 782.0 Cervical stenosis of spinal canal     ICD-10-CM: M48.02 
ICD-9-CM: 723.0 Vitals BP Pulse Resp Height(growth percentile) Weight(growth percentile) SpO2  
 130/70 66 20 5' 10\" (1.778 m) 231 lb (104.8 kg) 96% BMI Smoking Status 33.15 kg/m2 Never Smoker Vitals History BMI and BSA Data Body Mass Index Body Surface Area  
 33.15 kg/m 2 2.28 m 2 Your Updated Medication List  
  
   
This list is accurate as of: 1/31/18  4:27 PM.  Always use your most recent med list.  
  
  
  
  
 aspirin delayed-release 81 mg tablet Take  by mouth daily. clopidogrel 75 mg Tab Commonly known as:  PLAVIX Take  by mouth. FISH OIL PO Take  by mouth. FLOMAX 0.4 mg capsule Generic drug:  tamsulosin Take 0.4 mg by mouth daily. simvastatin 40 mg tablet Commonly known as:  ZOCOR Take  by mouth nightly. TRIBENZOR 40-10-25 mg Tab Generic drug:  Olmesartan-amLODIPine-HCTZ TAKE 1 TABLET BY MOUTH EVERY DAY We Performed the Following REFERRAL TO NEUROLOGY [EJN13 Custom] Comments: EMU To-Do List   
 01/31/2018 Imaging:  MRI CERV SPINE WO CONT   
  
 02/01/2018 10:30 AM  
  Appointment with Estee Sanchez at [facility]  [address] ([phone]) Please remember to arrive at the hospital at least 30 minutes prior to your scheduled appointment time. When you come in for your appointment, please be sure to bring the therapy prescription the doctor gave you, your insurance card, and a list of the medicines you are taking. Also, please remember to wear comfortable, loose- fitting clothes. We look forward to seeing you. 02/06/2018 10:30 AM  
  Appointment with Estee Sanchez at [facility]  [address] ([phone]) Please remember to arrive at the hospital at least 30 minutes prior to your scheduled appointment time. When you come in for your appointment, please be sure to bring the therapy prescription the doctor gave you, your insurance card, and a list of the medicines you are taking. Also, please remember to wear comfortable, loose- fitting clothes. We look forward to seeing you. 02/08/2018 10:30 AM  
  Appointment with Estee Sanchez at [facility]  [address] ([phone]) Please remember to arrive at the hospital at least 30 minutes prior to your scheduled appointment time. When you come in for your appointment, please be sure to bring the therapy prescription the doctor gave you, your insurance card, and a list of the medicines you are taking. Also, please remember to wear comfortable, loose- fitting clothes. We look forward to seeing you.  
  
 02/13/2018 11:00 AM  
  Appointment with Estee Sanchez at [facility]  [address] ([phone]) Please remember to arrive at the hospital at least 30 minutes prior to your scheduled appointment time. When you come in for your appointment, please be sure to bring the therapy prescription the doctor gave you, your insurance card, and a list of the medicines you are taking. Also, please remember to wear comfortable, loose- fitting clothes. We look forward to seeing you. 02/15/2018 11:00 AM  
  Appointment with Delgado Galeas at [facility] PT Kenia Engel ([phone]) Please remember to arrive at the hospital at least 30 minutes prior to your scheduled appointment time. When you come in for your appointment, please be sure to bring the therapy prescription the doctor gave you, your insurance card, and a list of the medicines you are taking. Also, please remember to wear comfortable, loose- fitting clothes. We look forward to seeing you. Referral Information Referral ID Referred By Referred To [id number] Forest Oquendo Not Available Visits Status Start Date End Date 1 New Request 1/31/18 1/31/19 If your referral has a status of pending review or denied, additional information will be sent to support the outcome of this decision. Patient Instructions A Healthy Lifestyle: Care Instructions Your Care Instructions A healthy lifestyle can help you feel good, stay at a healthy weight, and have plenty of energy for both work and play. A healthy lifestyle is something you can share with your whole family. A healthy lifestyle also can lower your risk for serious health problems, such as high blood pressure, heart disease, and diabetes. You can follow a few steps listed below to improve your health and the health of your family. Follow-up care is a key part of your treatment and safety. Be sure to make and go to all appointments, and call your doctor if you are having problems. It's also a good idea to know your test results and keep a list of the medicines you take. How can you care for yourself at home? · Do not eat too much sugar, fat, or fast foods. You can still have dessert and treats now and then. The goal is moderation. · Start small to improve your eating habits. Pay attention to portion sizes, drink less juice and soda pop, and eat more fruits and vegetables. ¨ Eat a healthy amount of food. A 3-ounce serving of meat, for example, is about the size of a deck of cards. Fill the rest of your plate with vegetables and whole grains. ¨ Limit the amount of soda and sports drinks you have every day. Drink more water when you are thirsty. ¨ Eat at least 5 servings of fruits and vegetables every day. It may seem like a lot, but it is not hard to reach this goal. A serving or helping is 1 piece of fruit, 1 cup of vegetables, or 2 cups of leafy, raw vegetables. Have an apple or some carrot sticks as an afternoon snack instead of a candy bar. Try to have fruits and/or vegetables at every meal. 
· Make exercise part of your daily routine. You may want to start with simple activities, such as walking, bicycling, or slow swimming. Try to be active 30 to 60 minutes every day. You do not need to do all 30 to 60 minutes all at once. For example, you can exercise 3 times a day for 10 or 20 minutes. Moderate exercise is safe for most people, but it is always a good idea to talk to your doctor before starting an exercise program. 
· Keep moving. Abdifatah Egan the lawn, work in the garden, or [place]. Take the stairs instead of the elevator at work. · If you smoke, quit. People who smoke have an increased risk for heart attack, stroke, cancer, and other lung illnesses. Quitting is hard, but there are ways to boost your chance of quitting tobacco for good. ¨ Use nicotine gum, patches, or lozenges. ¨ Ask your doctor about stop-smoking programs and medicines. ¨ Keep trying.  
In addition to reducing your risk of diseases in the future, you will notice some benefits soon after you stop using tobacco. If you have shortness of breath or asthma symptoms, they will likely get better within a few weeks after you quit. · Limit how much alcohol you drink. Moderate amounts of alcohol (up to 2 drinks a day for men, 1 drink a day for women) are okay. But drinking too much can lead to liver problems, high blood pressure, and other health problems. Family health If you have a family, there are many things you can do together to improve your health. · Eat meals together as a family as often as possible. · Eat healthy foods. This includes fruits, vegetables, lean meats and dairy, and whole grains. · Include your family in your fitness plan. Most people think of activities such as jogging or tennis as the way to fitness, but there are many ways you and your family can be more active. Anything that makes you breathe hard and gets your heart pumping is exercise. Here are some tips: 
¨ Walk to do errands or to take your child to school or the bus. ¨ Go for a family bike ride after dinner instead of watching TV. Where can you learn more? Go to http://johann-anna marie.info/. Enter D192 in the search box to learn more about \"A Healthy Lifestyle: Care Instructions. \" Current as of: May 12, 2017 Content Version: 11.4 © 7490-0148 Healthwise, Incorporated. Care instructions adapted under license by [employer] (which disclaims liability or warranty for this information). If you have questions about a medical condition or this instruction, always ask your healthcare professional. [place] [address] any warranty or liability for your use of this information. Introducing [facility]! William Betancur introduces [employer] patient portal. Now you can access parts of your medical record, email your doctor's office, and request medication refills online. 1. In your internet browser, go to [email or web address] 2. Click on the First Time User? Click Here link in the Sign In box. You will see the New Member Sign Up page. 3. Enter your [employer] Access Code exactly as it appears below. You will not need to use this code after youve completed the sign-up process. If you do not sign up before the expiration date, you must request a new code. · [employer] Access Code: [email or web address] Expires: 4/5/2018  2:16 PM 
 
4. Enter the last four digits of your Social Security Number (xxxx) and Date of Birth (mm/dd/yyyy) as indicated and click Submit. You will be taken to the next sign-up page. 5. Create a [employer] ID. This will be your [employer] login ID and cannot be changed, so think of one that is secure and easy to remember. 6. Create a [employer] password. You can change your password at any time. 7. Enter your Password Reset Question and Answer. This can be used at a later time if you forget your password. 8. Enter your e-mail address. You will receive e-mail notification when new information is available in [address] 9. Click Sign Up. You can now view and download portions of your medical record. 10. Click the Download Summary menu link to download a portable copy of your medical information. If you have questions, please visit the Frequently Asked Questions section of the [employer] website. Remember, [employer] is NOT to be used for urgent needs. For medical emergencies, dial 911. Now available from your iPhone and Android! Please provide this summary of care documentation to your next provider. Your primary care clinician is listed as Phys Other. If you have any questions after today's visit, please call [phone].

## 2018-01-31 NOTE — PATIENT INSTRUCTIONS

## 2018-01-31 NOTE — PROGRESS NOTES
PT DAILY TREATMENT NOTE - [facility] 2-15    Patient Name: Isabelle Pop  Date:2018  : 1940  [x]  Patient  Verified  [place]or[place] MED[place] / Plan: [place] MEDICARE PART A & B / Product Type: Medicare /    In time:9:40 AM Out time:10:35 AM  Total Treatment Time (min): 55  Total Timed Codes (min): 55  1:1 Treatment Time ( [address] only): 25   Visit #: 3     Treatment Area: Unspecified sequelae of cerebral infarction [I69.30]  Other symptoms and signs involving the musculoskeletal system [R29.898]  Unspecified abnormal involuntary movements [R25.9]  Unspecified disturbances of skin sensation [R20.9]    SUBJECTIVE  Pain Level (0-10 scale): 0  Any medication changes, allergies to medications, adverse drug reactions, diagnosis change, or new procedure performed?: [x] No    [] Yes (see summary sheet for update)  Subjective functional status/changes:   [] No changes reported  Patient reports he believes he is more weaker in the morning due to his internal clock being off. Otherwise, he is doing much better and is able to perform his HEP daily.     OBJECTIVE    40 min Therapeutic Exercise:  [x] See flow sheet :   Rationale: increase ROM, increase strength, improve coordination and increase proprioception to improve the patients ability to grasp objects, complete ADL's    15 min Neuromuscular Re-education:  [x]  See flow sheet :   Rationale: increase ROM, increase strength, improve coordination and increase proprioception  to improve the patients ability to grasp objects, complete ADL's    With   [] TE   [] TA   [] neuro   [] other: Patient Education: [x] Review HEP    [] Progressed/Changed HEP based on:   [] positioning   [] body mechanics   [] transfers   [] heat/ice application    [] other:      Other Objective/Functional Measures:    Pain Level (0-10 scale) post treatment: 0    ASSESSMENT/Changes in Function:     Patient will continue to benefit from skilled PT services to modify and progress therapeutic interventions, address functional mobility deficits, address ROM deficits, address strength deficits, analyze and address soft tissue restrictions and analyze and cue movement patterns to attain remaining goals. []  See Plan of Care  []  See progress note/recertification  []  See Discharge Summary         Progress towards goals / Updated goals:  Patient continues to do very well with PT and will look to progress the resistance of therapeutic exercises next visit to allow for further progression towards short term goals. PLAN  [x]  Upgrade activities as tolerated     [x]  Continue plan of care  [x]  Update interventions per flow sheet       []  Discharge due to:_  []  Other:_      Marianne Suh, PT , DPT, OCS, Cert.  DN   1/31/2018  11:22 AM

## 2018-02-01 ENCOUNTER — HOSPITAL ENCOUNTER (OUTPATIENT)
Dept: PHYSICAL THERAPY | Age: 78
Discharge: HOME | End: 2018-02-01
Payer: MEDICARE

## 2018-02-01 PROCEDURE — 97112 NEUROMUSCULAR REEDUCATION: CPT

## 2018-02-01 PROCEDURE — 97110 THERAPEUTIC EXERCISES: CPT

## 2018-02-01 NOTE — PROGRESS NOTES
PT DAILY TREATMENT NOTE - [facility] 2-15    Patient Name: Tr Sims  Date:2018  : 1940  [x]  Patient  Verified  Payor: Sussy Bravo / Plan: [place] MEDICARE PART A & B / Product Type: Medicare /    In time:10:30 AM Out time:11:25a  Total Treatment Time (min): 55  Total Timed Codes (min): 55  1:1 Treatment Time ( [address] only): 54   Visit #: 4     Treatment Area: Unspecified sequelae of cerebral infarction [I69.30]  Other symptoms and signs involving the musculoskeletal system [R29.898]  Unspecified abnormal involuntary movements [R25.9]  Unspecified disturbances of skin sensation [R20.9]    SUBJECTIVE  Pain Level (0-10 scale): 0  Any medication changes, allergies to medications, adverse drug reactions, diagnosis change, or new procedure performed?: [x] No    [] Yes (see summary sheet for update)  Subjective functional status/changes:   [] No changes reported  Patient reports his hand does not feel as \"heavy\" as it has in the past and feels some of the numbness has gotten better.     OBJECTIVE    40 min Therapeutic Exercise:  [x] See flow sheet :   Rationale: increase ROM, increase strength, improve coordination and increase proprioception to improve the patients ability to grasp objects, complete ADL's    15 min Neuromuscular Re-education:  [x]  See flow sheet :   Rationale: increase ROM, increase strength, improve coordination and increase proprioception  to improve the patients ability to grasp objects, complete ADL's    With   [] TE   [] TA   [] neuro   [] other: Patient Education: [x] Review HEP    [] Progressed/Changed HEP based on:   [] positioning   [] body mechanics   [] transfers   [] heat/ice application    [] other:      Other Objective/Functional Measures:      Pain Level (0-10 scale) post treatment: 0    ASSESSMENT/Changes in Function:     Patient will continue to benefit from skilled PT services to modify and progress therapeutic interventions, address functional mobility deficits, address ROM deficits, address strength deficits, analyze and address soft tissue restrictions and analyze and cue movement patterns to attain remaining goals. []  See Plan of Care  []  See progress note/recertification  []  See Discharge Summary         Progress towards goals / Updated goals:   Patient with good tolerance for all interventions and was able to advance several. Patient is making good progress towards goals with improved strength and symptoms.      PLAN  [x]  Upgrade activities as tolerated     [x]  Continue plan of care  [x]  Update interventions per flow sheet       []  Discharge due to:_  []  Other:_      Armando Blount PTA  2/1/2018  11:22 AM

## 2018-02-05 ENCOUNTER — HOSPITAL ENCOUNTER (OUTPATIENT)
Dept: MRI IMAGING | Age: 78
Discharge: HOME | End: 2018-02-05
Payer: MEDICARE

## 2018-02-05 DIAGNOSIS — R25.9 ABNORMAL INVOLUNTARY MOVEMENT: ICD-10-CM

## 2018-02-05 DIAGNOSIS — M48.02 CERVICAL STENOSIS OF SPINAL CANAL: ICD-10-CM

## 2018-02-05 PROCEDURE — 72141 MRI NECK SPINE W/O DYE: CPT

## 2018-02-06 ENCOUNTER — HOSPITAL ENCOUNTER (OUTPATIENT)
Dept: PHYSICAL THERAPY | Age: 78
Discharge: HOME | End: 2018-02-06
Payer: MEDICARE

## 2018-02-06 PROCEDURE — 97112 NEUROMUSCULAR REEDUCATION: CPT

## 2018-02-06 PROCEDURE — 97110 THERAPEUTIC EXERCISES: CPT

## 2018-02-06 NOTE — PROGRESS NOTES
PT DAILY TREATMENT NOTE - [facility] 2-15    Patient Name: Capri Uribe  Date:2018  : 1940  [x]  Patient  Verified  Payor: [place] MED[place] / Plan: [place] MED[place]ARE PART A & B / Product Type: Medicare /    In time:10:40 a Out time:11:35a  Total Treatment Time (min): 55  Total Timed Codes (min): 55  1:1 Treatment Time ( [address] only): 54   Visit #: 5    Treatment Area: Unspecified sequelae of cerebral infarction [I69.30]  Other symptoms and signs involving the musculoskeletal system [R29.898]  Unspecified abnormal involuntary movements [R25.9]  Unspecified disturbances of skin sensation [R20.9]    SUBJECTIVE  Pain Level (0-10 scale): 0  Any medication changes, allergies to medications, adverse drug reactions, diagnosis change, or new procedure performed?: [x] No    [] Yes (see summary sheet for update)  Subjective functional status/changes:   [] No changes reported  Patient reports he got a hand strengthening device that is like the thera-web and it has mad a big difference. Patient states his hand continues to get better with decreased symptoms and increase in strength.     OBJECTIVE    40 min Therapeutic Exercise:  [x] See flow sheet :   Rationale: increase ROM, increase strength, improve coordination and increase proprioception to improve the patients ability to grasp objects, complete ADL's    15 min Neuromuscular Re-education:  [x]  See flow sheet :   Rationale: increase ROM, increase strength, improve coordination and increase proprioception  to improve the patients ability to grasp objects, complete ADL's    With   [] TE   [] TA   [] neuro   [] other: Patient Education: [x] Review HEP    [] Progressed/Changed HEP based on:   [] positioning   [] body mechanics   [] transfers   [] heat/ice application    [] other:      Other Objective/Functional Measures:      Pain Level (0-10 scale) post treatment: 0     ASSESSMENT/Changes in Function:     Patient will continue to benefit from skilled PT services to modify and progress therapeutic interventions, address functional mobility deficits, address ROM deficits, address strength deficits, analyze and address soft tissue restrictions and analyze and cue movement patterns to attain remaining goals. []  See Plan of Care  []  See progress note/recertification  []  See Discharge Summary         Progress towards goals / Updated goals:   Patient with good tolerance for all interventions and was able to advance several. Patient is making good progress towards goals with improved strength and symptoms.      PLAN  [x]  Upgrade activities as tolerated     [x]  Continue plan of care  [x]  Update interventions per flow sheet       []  Discharge due to:_  []  Other:_      Gina Matias , GEREMIAS  2/6/2018  11:22 AM

## 2018-02-08 ENCOUNTER — HOSPITAL ENCOUNTER (OUTPATIENT)
Dept: PHYSICAL THERAPY | Age: 78
Discharge: HOME | End: 2018-02-08
Payer: MEDICARE

## 2018-02-08 PROCEDURE — 97112 NEUROMUSCULAR REEDUCATION: CPT

## 2018-02-08 PROCEDURE — 97110 THERAPEUTIC EXERCISES: CPT

## 2018-02-08 NOTE — PROGRESS NOTES
PT DAILY TREATMENT NOTE - [facility] 2-15    Patient Name: Isabelle Pop  Date:2018  : 1940  [x]  Patient  Verified  Payor: Kalpana Blend / Plan: [place] MEDICARE PART A & B / Product Type: Medicare /    In time:10:40a Out time:11:35a  Total Treatment Time (min): 55  Total Timed Codes (min): 55  1:1 Treatment Time ( [address] only): 54   Visit #: 6    Treatment Area: Unspecified sequelae of cerebral infarction [I69.30]  Other symptoms and signs involving the musculoskeletal system [R29.898]  Unspecified abnormal involuntary movements [R25.9]  Unspecified disturbances of skin sensation [R20.9]    SUBJECTIVE  Pain Level (0-10 scale): 0  Any medication changes, allergies to medications, adverse drug reactions, diagnosis change, or new procedure performed?: [x] No    [] Yes (see summary sheet for update)  Subjective functional status/changes:   [x] No changes reported      OBJECTIVE    40 min Therapeutic Exercise:  [x] See flow sheet :   Rationale: increase ROM, increase strength, improve coordination and increase proprioception to improve the patients ability to grasp objects, complete ADL's    15 min Neuromuscular Re-education:  [x]  See flow sheet :   Rationale: increase ROM, increase strength, improve coordination and increase proprioception  to improve the patients ability to grasp objects, complete ADL's    With   [] TE   [] TA   [] neuro   [] other: Patient Education: [x] Review HEP    [] Progressed/Changed HEP based on:   [] positioning   [] body mechanics   [] transfers   [] heat/ice application    [] other:      Other Objective/Functional Measures:      Pain Level (0-10 scale) post treatment: 0     ASSESSMENT/Changes in Function:     Patient will continue to benefit from skilled PT services to modify and progress therapeutic interventions, address functional mobility deficits, address ROM deficits, address strength deficits, analyze and address soft tissue restrictions and analyze and cue movement patterns to attain remaining goals. []  See Plan of Care  []  See progress note/recertification  []  See Discharge Summary         Progress towards goals / Updated goals:   Patient with good tolerance for all interventions and was able to advance several. Patient is making good progress towards goals with improved strength and symptoms. Will take measurements and go over goals at next visit.     PLAN  [x]  Upgrade activities as tolerated     [x]  Continue plan of care  [x]  Update interventions per flow sheet       []  Discharge due to:_  []  Other:_      Beth Christopher PTA  2/8/2018  11:22 AM

## 2018-02-13 ENCOUNTER — HOSPITAL ENCOUNTER (OUTPATIENT)
Dept: PHYSICAL THERAPY | Age: 78
Discharge: HOME | End: 2018-02-13
Payer: MEDICARE

## 2018-02-13 PROCEDURE — 97112 NEUROMUSCULAR REEDUCATION: CPT

## 2018-02-13 PROCEDURE — 97110 THERAPEUTIC EXERCISES: CPT

## 2018-02-13 NOTE — PROGRESS NOTES
PT DAILY TREATMENT NOTE - [facility] 2-15    Patient Name: Everett Ball  Date:2018  : 1940  [x]  Patient  Verified  Payor: [place] MEDICARE / Plan: [place] MEDICARE PART A & B / Product Type: Medicare /    In time:11:05a Out time:12:05p  Total Treatment Time (min): 60  Total Timed Codes (min): 55  1:1 Treatment Time ( [address] only): 54   Visit #: 7    Treatment Area: Unspecified sequelae of cerebral infarction [I69.30]  Other symptoms and signs involving the musculoskeletal system [R29.898]  Unspecified abnormal involuntary movements [R25.9]  Unspecified disturbances of skin sensation [R20.9]    SUBJECTIVE  Pain Level (0-10 scale): 0  Any medication changes, allergies to medications, adverse drug reactions, diagnosis change, or new procedure performed?: [x] No    [] Yes (see summary sheet for update)  Subjective functional status/changes:   [] No changes reported   Patient reports his hand still feels heavy and has to concentrate on holding objects as sometimes he tend to drop things if he is not concentrating. Patient reports the only other thing he has difficulty with is finger dexterity and some difficulty opening jars , but this has improved.      OBJECTIVE    40 min Therapeutic Exercise:  [x] See flow sheet :   Rationale: increase ROM, increase strength, improve coordination and increase proprioception to improve the patients ability to grasp objects, complete ADL's    15 min Neuromuscular Re-education:  [x]  See flow sheet :   Rationale: increase ROM, increase strength, improve coordination and increase proprioception  to improve the patients ability to grasp objects, complete ADL's    With   [] TE   [] TA   [] neuro   [] other: Patient Education: [x] Review HEP    [] Progressed/Changed HEP based on:   [] positioning   [] body mechanics   [] transfers   [] heat/ice application    [] other:      Other Objective/Functional Measures:      Strength: MMT   Right Left   Shoulder Flex 5/5 4+/5            abd 5/5 4/+5        Elbow Ext/flex 5/5 4+/5   Forearm Supination 5/5 5/5     Pronation 5/5 5/5   Wrist Flex 5/5 4+/5     Ext 5/5 4+/5     Ulnar Dev 5/5 5/5     Radial Dev 5/5 5/5           Hand Strength: Gross Grasp 3pt Pinch Lateral Pinch Tip Pinch   Right  58 21       Left 47 18              Pain Level (0-10 scale) post treatment: 0     ASSESSMENT/Changes in Function:     Patient will continue to benefit from skilled PT services to modify and progress therapeutic interventions, address functional mobility deficits, address ROM deficits, address strength deficits, analyze and address soft tissue restrictions and analyze and cue movement patterns to attain remaining goals. []  See Plan of Care  []  See progress note/recertification  []  See Discharge Summary         Progress towards goals / Updated goals:   Patient with good tolerance for all interventions and was able to advance several. Patient is making good progress towards goals with improved strength and symptoms. Short Term Goals: To be accomplished in 8 treatments:  1. Patient will improve general grasp strength by 10# to allow for improved ability to carry a heavy bag of groceries. Met  2. Patient will improve 3 point pinch strength by 5# to allow for improved ability to  his keys. Met  3. Patient will be able to carry a 20# object in his left hand for 50 ft. Met  Long Term Goals: To be accomplished in 16 treatments:  1. Patient will be able to lift 5# over his head with the L UE. Progressing  2. Patient will be able to open a jar with the L hand with no limitation. Progressing  3. Patient will be able to carry a 30# objects in his left hand for 50 ft. Frequency / Duration: Patient to be seen 2 times per week for 8 weeks.     PLAN  [x]  Upgrade activities as tolerated     [x]  Continue plan of care  [x]  Update interventions per flow sheet       []  Discharge due to:_  []  Other:_      Andreina Jimenez , GEREMIAS  2/13/2018  11:22 AM

## 2018-02-15 ENCOUNTER — HOSPITAL ENCOUNTER (OUTPATIENT)
Dept: PHYSICAL THERAPY | Age: 78
Discharge: HOME | End: 2018-02-15
Payer: MEDICARE

## 2018-02-15 PROCEDURE — 97112 NEUROMUSCULAR REEDUCATION: CPT

## 2018-02-15 PROCEDURE — 97110 THERAPEUTIC EXERCISES: CPT

## 2018-02-15 NOTE — PROGRESS NOTES
PT DAILY TREATMENT NOTE - [facility] 2-15    Patient Name: Jennifer Torres  Date:2/15/2018  : 1940  [x]  Patient  Verified  Payor: Amy Dickerson / Plan: [place] MEDICARE PART A & B / Product Type: Medicare /    In time:11:00a Out time:11:55a  Total Treatment Time (min): 55  Total Timed Codes (min): 55  1:1 Treatment Time ( [address] only): 54   Visit #: 8    Treatment Area: Unspecified sequelae of cerebral infarction [I69.30]  Other symptoms and signs involving the musculoskeletal system [R29.898]  Unspecified abnormal involuntary movements [R25.9]  Unspecified disturbances of skin sensation [R20.9]    SUBJECTIVE  Pain Level (0-10 scale): 0  Any medication changes, allergies to medications, adverse drug reactions, diagnosis change, or new procedure performed?: [x] No    [] Yes (see summary sheet for update)  Subjective functional status/changes:   [] No changes reported  Patient reports he has been getting better, but still has min difficulty with opening jars. Patient states he would like to try continuing his program at home at this point.     OBJECTIVE    40 min Therapeutic Exercise:  [x] See flow sheet :   Rationale: increase ROM, increase strength, improve coordination and increase proprioception to improve the patients ability to grasp objects, complete ADL's    15 min Neuromuscular Re-education:  [x]  See flow sheet :   Rationale: increase ROM, increase strength, improve coordination and increase proprioception  to improve the patients ability to grasp objects, complete ADL's    With   [] TE   [] TA   [] neuro   [] other: Patient Education: [x] Review HEP    [] Progressed/Changed HEP based on:   [] positioning   [] body mechanics   [] transfers   [] heat/ice application    [] other:      Other Objective/Functional Measures:      Strength: MMT   Right Left   Shoulder Flex 5/5 4+/5            abd 5/5 4/+5                      Elbow Ext/flex 5/5 4+/5   Forearm Supination 5/5 5/5     Pronation 5/5 5/5   Wrist Flex 5/5 4+/5   Ext 5/5 4+/5     Ulnar Dev 5/5 5/5     Radial Dev 5/5 5/5           Hand Strength: Gross Grasp 3pt Pinch Lateral Pinch Tip Pinch   Right  58 21       Left 47 18              Pain Level (0-10 scale) post treatment: 0     ASSESSMENT/Changes in Function:     Patient will continue to benefit from skilled PT services to modify and progress therapeutic interventions, address functional mobility deficits, address ROM deficits, address strength deficits, analyze and address soft tissue restrictions and analyze and cue movement patterns to attain remaining goals. []  See Plan of Care  []  See progress note/recertification  []  See Discharge Summary         Progress towards goals / Updated goals:     Short Term Goals: To be accomplished in 8 treatments:  1. Patient will improve general grasp strength by 10# to allow for improved ability to carry a heavy bag of groceries. Met  2. Patient will improve 3 point pinch strength by 5# to allow for improved ability to  his keys. Met  3. Patient will be able to carry a 20# object in his left hand for 50 ft. Met  Long Term Goals: To be accomplished in 16 treatments:  1. Patient will be able to lift 5# over his head with the L UE. Met  2. Patient will be able to open a jar with the L hand with no limitation. Progressing, some difficulty, but has improved  3. Patient will be able to carry a 30# objects in his left hand for 50 ft. Met  Frequency / Duration: Patient to be seen 2 times per week for 8 weeks.     PLAN  []  Upgrade activities as tolerated     []  Continue plan of care  []  Update interventions per flow sheet       []  Discharge due to:_  []  Other:_      Ivon Kidney , PTA  2/15/2018  11:22 AM

## 2018-02-21 ENCOUNTER — HOSPITAL ENCOUNTER (OUTPATIENT)
Dept: PHYSICAL THERAPY | Age: 78
Discharge: HOME | End: 2018-02-21
Payer: MEDICARE

## 2018-02-21 PROCEDURE — 97110 THERAPEUTIC EXERCISES: CPT | Performed by: PHYSICAL MEDICINE & REHABILITATION

## 2018-02-21 NOTE — PROGRESS NOTES
PT DAILY TREATMENT NOTE - [facility] 2-15    Patient Name: Sathish Booth  Date:2018  : 1940  [x]  Patient  Verified  Payor: [place] MEDICARE / Plan: [place] MEDICARE PART A & B / Product Type: Medicare /    In time:1130am  Out time:1205pm  Total Treatment Time (min): 35  Total Timed Codes (min): 35  1:1 Treatment Time ( [address] only): 25   Visit #: 9     Treatment Area: Unspecified sequelae of cerebral infarction [I69.30]  Other symptoms and signs involving the musculoskeletal system [R29.898]  Unspecified abnormal involuntary movements [R25.9]  Unspecified disturbances of skin sensation [R20.9]    SUBJECTIVE  Pain Level (0-10 scale): 0/10  Any medication changes, allergies to medications, adverse drug reactions, diagnosis change, or new procedure performed?: [x] No    [] Yes (see summary sheet for update)  Subjective functional status/changes:   [] No changes reported  Patient reports he hasn't any increased pain since last visit, but he did forget to take his BP medication today resulting in a lighter treatment day. OBJECTIVE    35 min Therapeutic Exercise:  [x] See flow sheet :   Rationale: increase ROM, increase strength and increase proprioception to improve the patients ability to perform ADLs, lifting and gripping ability    Other Objective/Functional Measures: No increase pain or difficulties while performing interventions     Pain Level (0-10 scale) post treatment: 0/10    ASSESSMENT/Changes in Function:     Patient will continue to benefit from skilled PT services to modify and progress therapeutic interventions, address functional mobility deficits, address ROM deficits, address strength deficits, analyze and address soft tissue restrictions and analyze and cue movement patterns to attain remaining goals.      []  See Plan of Care  []  See progress note/recertification  []  See Discharge Summary         Progress towards goals / Updated goals:  Patient is progressing well towards goals, will continue to improve wrist strength in order to reduce fatigue and improve ADLs.      PLAN  [x]  Upgrade activities as tolerated     [x]  Continue plan of care  [x]  Update interventions per flow sheet       []  Discharge due to:_  []  Other:_      Hilario Lucia 2/21/2018  11:49 AM  2TE

## 2018-03-01 ENCOUNTER — HOSPITAL ENCOUNTER (OUTPATIENT)
Dept: PHYSICAL THERAPY | Age: 78
Discharge: HOME | End: 2018-03-01
Payer: MEDICARE

## 2018-03-01 PROCEDURE — 97110 THERAPEUTIC EXERCISES: CPT

## 2018-03-01 PROCEDURE — 97112 NEUROMUSCULAR REEDUCATION: CPT

## 2018-03-01 NOTE — PROGRESS NOTES
PT DAILY TREATMENT NOTE - [facility] 2-15    Patient Name: Allyssa Nelson  Date:3/1/2018  : 1940  [x]  Patient  Verified  Payor: Dmitri Offer / Plan: [place] MEDICARE PART A & B / Product Type: Medicare /    In time:10:40a  Out time: 11:35a  Total Treatment Time (min): 55  Total Timed Codes (min): 55  1:1 Treatment Time ([address] only): 30   Visit #: 10     Treatment Area: Unspecified sequelae of cerebral infarction [I69.30]  Other symptoms and signs involving the musculoskeletal system [R29.898]  Unspecified abnormal involuntary movements [R25.9]  Unspecified disturbances of skin sensation [R20.9]    SUBJECTIVE  Pain Level (0-10 scale): 0/10  Any medication changes, allergies to medications, adverse drug reactions, diagnosis change, or new procedure performed?: [x] No    [] Yes (see summary sheet for update)  Subjective functional status/changes:   [] No changes reported  Patient reports his hand feels about the same. OBJECTIVE    55 min Therapeutic Exercise:  [x] See flow sheet :   Rationale: increase ROM, increase strength and increase proprioception to improve the patients ability to perform ADLs, lifting and gripping ability    Other Objective/Functional Measures: No increase pain or difficulties while performing interventions     Pain Level (0-10 scale) post treatment: 0/10    ASSESSMENT/Changes in Function:     Patient will continue to benefit from skilled PT services to modify and progress therapeutic interventions, address functional mobility deficits, address ROM deficits, address strength deficits, analyze and address soft tissue restrictions and analyze and cue movement patterns to attain remaining goals. []  See Plan of Care  []  See progress note/recertification  []  See Discharge Summary         Progress towards goals / Updated goals:   Patient demonstrates good tolerance for interventions and is able to advance several. Patient will do well with continued strength to reach all goals.     PLAN  [x] Upgrade activities as tolerated     [x]  Continue plan of care  [x]  Update interventions per flow sheet       []  Discharge due to:_  []  Other:_      Wilver Brown 3/1/2018  11:49 AM

## 2018-03-15 ENCOUNTER — HOSPITAL ENCOUNTER (OUTPATIENT)
Dept: PHYSICAL THERAPY | Age: 78
Discharge: HOME | End: 2018-03-15
Payer: MEDICARE

## 2018-03-15 PROCEDURE — 97110 THERAPEUTIC EXERCISES: CPT

## 2018-03-15 NOTE — PROGRESS NOTES
PT DAILY TREATMENT NOTE - [facility] 2-15    Patient Name: Nikia Neely  Date:3/15/2018  : 1940  [x]  Patient  Verified  Payor: Jamal Jaimes / Plan: [place] MEDICARE PART A & B / Product Type: Medicare /    In time:10:35a  Out time: 11:25a  Total Treatment Time (min): 50  Total Timed Codes (min): 50  1:1 Treatment Time ( [address] only): 40   Visit #: 11     Treatment Area: Unspecified sequelae of cerebral infarction [I69.30]  Other symptoms and signs involving the musculoskeletal system [R29.898]  Unspecified abnormal involuntary movements [R25.9]  Unspecified disturbances of skin sensation [R20.9]    SUBJECTIVE  Pain Level (0-10 scale): 0/10  Any medication changes, allergies to medications, adverse drug reactions, diagnosis change, or new procedure performed?: [x] No    [] Yes (see summary sheet for update)  Subjective functional status/changes:   [] No changes reported  Patient reports he feels less stiffness throughout the day. OBJECTIVE    50 min Therapeutic Exercise:  [x] See flow sheet :   Rationale: increase ROM, increase strength and increase proprioception to improve the patients ability to perform ADLs, lifting and gripping ability    Other Objective/Functional Measures: No increase pain or difficulties while performing interventions     Pain Level (0-10 scale) post treatment: 0/10    ASSESSMENT/Changes in Function:     Patient will continue to benefit from skilled PT services to modify and progress therapeutic interventions, address functional mobility deficits, address ROM deficits, address strength deficits, analyze and address soft tissue restrictions and analyze and cue movement patterns to attain remaining goals.      []  See Plan of Care  []  See progress note/recertification  []  See Discharge Summary         Progress towards goals / Updated goals:   Patient demonstrates good tolerance for interventions and is able to advance several. Patient will do well with continued strength to reach all goals.    PLAN  [x]  Upgrade activities as tolerated     [x]  Continue plan of care  [x]  Update interventions per flow sheet       []  Discharge due to:_  []  Other:_      Richar Brand 3/15/2018  11:49 AM

## 2018-03-22 ENCOUNTER — APPOINTMENT (OUTPATIENT)
Dept: PHYSICAL THERAPY | Age: 78
End: 2018-03-22
Payer: MEDICARE

## 2018-03-29 ENCOUNTER — HOSPITAL ENCOUNTER (OUTPATIENT)
Dept: PHYSICAL THERAPY | Age: 78
Discharge: HOME | End: 2018-03-29
Payer: MEDICARE

## 2018-03-29 PROCEDURE — G8980 MOBILITY D/C STATUS: HCPCS | Performed by: PHYSICAL THERAPIST

## 2018-03-29 PROCEDURE — 97530 THERAPEUTIC ACTIVITIES: CPT

## 2018-03-29 PROCEDURE — G8979 MOBILITY GOAL STATUS: HCPCS | Performed by: PHYSICAL THERAPIST

## 2018-03-29 NOTE — PROGRESS NOTES
PT DAILY TREATMENT NOTE - [facility] 2-15    Patient Name: Christie Stanford  Date:3/29/2018  : 1940  [x]  Patient  Verified  Payor: [employer] Plan / Plan: [place] MEDICARE PART A & B / Product Type: Medicare /    In time:10:45a  Out time: 11:15a  Total Treatment Time (min): 30  Total Timed Codes (min): 30  1:1 Treatment Time ( only): 30   Visit #: 12     Treatment Area: Unspecified sequelae of cerebral infarction [I69.30]  Other symptoms and signs involving the musculoskeletal system [R29.898]  Unspecified abnormal involuntary movements [R25.9]  Unspecified disturbances of skin sensation [R20.9]    SUBJECTIVE  Pain Level (0-10 scale): 0/10  Any medication changes, allergies to medications, adverse drug reactions, diagnosis change, or new procedure performed?: [x] No    [] Yes (see summary sheet for update)  Subjective functional status/changes:   [x] No changes reported      OBJECTIVE    30 min Therapeutic Activities:  [x] See flow sheet : review exercises   Rationale: increase ROM, increase strength and increase proprioception to improve the patients ability to perform ADLs, lifting and gripping ability    Other Objective/Functional Measures:   Strength: MMT    Right Left   Shoulder Flex 5/5 5-/5                 abd 5/5 5-/5                                  Elbow Ext/flex 5/5 5/5   Forearm Supination 5/5 5/5      Pronation 5/5 5/5   Wrist Flex 5/5 5-/5      Ext 5/5 5-/5      Ulnar Dev 5/5 5/5      Radial Dev 5/5 5/5              Hand Strength: Gross Grasp 3pt Pinch Lateral Pinch Tip Pinch   Right  65 21        Left 52 18.5                  Pain Level (0-10 scale) post treatment: 0/10    ASSESSMENT/Changes in Function:     Patient will continue to benefit from skilled PT services to modify and progress therapeutic interventions, address functional mobility deficits, address ROM deficits, address strength deficits, analyze and address soft tissue restrictions and analyze and cue movement patterns to attain remaining goals.     []  See Plan of Care  []  See progress note/recertification  [x]  See Discharge Summary         Progress towards goals / Updated goals:   Short Term Goals: To be accomplished in 8 treatments:  1. Patient will improve general grasp strength by 10# to allow for improved ability to carry a heavy bag of groceries. Met  2. Patient will improve 3 point pinch strength by 5# to allow for improved ability to  his keys. Met  3. Patient will be able to carry a 20# object in his left hand for 50 ft. Met  Long Term Goals: To be accomplished in 16 treatments:  1. Patient will be able to lift 5# over his head with the L UE. Met  2. Patient will be able to open a jar with the L hand with no limitation. Not met  3. Patient will be able to carry a 30# objects in his left hand for 50 ft. Met  Frequency / Duration: Patient to be seen 2 times per week for 8 weeks.     PLAN  []  Upgrade activities as tolerated     []  Continue plan of care  []  Update interventions per flow sheet       []  Discharge due to:_  []  Other:_      Ana Lilia Milling 3/29/2018  11:49 AM

## 2018-04-02 ENCOUNTER — HOSPITAL ENCOUNTER (INPATIENT)
Age: 78
LOS: 2 days | Discharge: HOME | DRG: 093 | End: 2018-04-04
Attending: PSYCHIATRY & NEUROLOGY | Admitting: PSYCHIATRY & NEUROLOGY
Payer: MEDICARE

## 2018-04-02 ENCOUNTER — APPOINTMENT (OUTPATIENT)
Dept: NEUROLOGY | Age: 78
DRG: 093 | End: 2018-04-02
Attending: PSYCHIATRY & NEUROLOGY
Payer: MEDICARE

## 2018-04-02 DIAGNOSIS — G25.3 SLEEP MYOCLONUS: ICD-10-CM

## 2018-04-02 DIAGNOSIS — R25.9 ABNORMAL INVOLUNTARY MOVEMENTS: ICD-10-CM

## 2018-04-02 LAB
ALBUMIN SERPL-MCNC: 3.4 G/DL (ref 3.5–5)
ALBUMIN/GLOB SERPL: 0.8 {RATIO} (ref 1.1–2.2)
ALP SERPL-CCNC: 47 U/L (ref 45–117)
ALT SERPL-CCNC: 36 U/L (ref 12–78)
AMPHET UR QL SCN: NEGATIVE
ANION GAP SERPL CALC-SCNC: 5 MMOL/L (ref 5–15)
AST SERPL-CCNC: 33 U/L (ref 15–37)
BARBITURATES UR QL SCN: NEGATIVE
BASOPHILS # BLD: 0 K/UL (ref 0–0.1)
BASOPHILS NFR BLD: 0 % (ref 0–1)
BENZODIAZ UR QL: NEGATIVE
BILIRUB SERPL-MCNC: 0.2 MG/DL (ref 0.2–1)
BUN SERPL-MCNC: 20 MG/DL (ref 6–20)
BUN/CREAT SERPL: 17 (ref 12–20)
CALCIUM SERPL-MCNC: 8.6 MG/DL (ref 8.5–10.1)
CANNABINOIDS UR QL SCN: NEGATIVE
CHLORIDE SERPL-SCNC: 106 MMOL/L (ref 97–108)
CO2 SERPL-SCNC: 29 MMOL/L (ref 21–32)
COCAINE UR QL SCN: NEGATIVE
CREAT SERPL-MCNC: 1.16 MG/DL (ref 0.7–1.3)
DIFFERENTIAL METHOD BLD: NORMAL
DRUG SCRN COMMENT,DRGCM: NORMAL
EOSINOPHIL # BLD: 0.2 K/UL (ref 0–0.4)
EOSINOPHIL NFR BLD: 2 % (ref 0–7)
ERYTHROCYTE [DISTWIDTH] IN BLOOD BY AUTOMATED COUNT: 13.9 % (ref 11.5–14.5)
GLOBULIN SER CALC-MCNC: 4.3 G/DL (ref 2–4)
GLUCOSE SERPL-MCNC: 80 MG/DL (ref 65–100)
HCT VFR BLD AUTO: 37.4 % (ref 36.6–50.3)
HGB BLD-MCNC: 12.4 G/DL (ref 12.1–17)
IMM GRANULOCYTES # BLD: 0 K/UL (ref 0–0.04)
IMM GRANULOCYTES NFR BLD AUTO: 0 % (ref 0–0.5)
LYMPHOCYTES # BLD: 1.9 K/UL (ref 0.8–3.5)
LYMPHOCYTES NFR BLD: 23 % (ref 12–49)
MCH RBC QN AUTO: 27.3 PG (ref 26–34)
MCHC RBC AUTO-ENTMCNC: 33.2 G/DL (ref 30–36.5)
MCV RBC AUTO: 82.4 FL (ref 80–99)
METHADONE UR QL: NEGATIVE
MONOCYTES # BLD: 0.9 K/UL (ref 0–1)
MONOCYTES NFR BLD: 11 % (ref 5–13)
NEUTS SEG # BLD: 5 K/UL (ref 1.8–8)
NEUTS SEG NFR BLD: 63 % (ref 32–75)
NRBC # BLD: 0 K/UL (ref 0–0.01)
NRBC BLD-RTO: 0 PER 100 WBC
OPIATES UR QL: NEGATIVE
PCP UR QL: NEGATIVE
PLATELET # BLD AUTO: 178 K/UL (ref 150–400)
PMV BLD AUTO: 10.8 FL (ref 8.9–12.9)
POTASSIUM SERPL-SCNC: 3.7 MMOL/L (ref 3.5–5.1)
PROT SERPL-MCNC: 7.7 G/DL (ref 6.4–8.2)
RBC # BLD AUTO: 4.54 M/UL (ref 4.1–5.7)
SODIUM SERPL-SCNC: 140 MMOL/L (ref 136–145)
WBC # BLD AUTO: 8 K/UL (ref 4.1–11.1)

## 2018-04-02 PROCEDURE — 74011250637 HC RX REV CODE- 250/637: Performed by: PSYCHIATRY & NEUROLOGY

## 2018-04-02 PROCEDURE — 80053 COMPREHEN METABOLIC PANEL: CPT | Performed by: PSYCHIATRY & NEUROLOGY

## 2018-04-02 PROCEDURE — 36415 COLL VENOUS BLD VENIPUNCTURE: CPT | Performed by: PSYCHIATRY & NEUROLOGY

## 2018-04-02 PROCEDURE — 80307 DRUG TEST PRSMV CHEM ANLYZR: CPT | Performed by: PSYCHIATRY & NEUROLOGY

## 2018-04-02 PROCEDURE — 65660000000 HC RM CCU STEPDOWN

## 2018-04-02 PROCEDURE — 74011250636 HC RX REV CODE- 250/636: Performed by: PSYCHIATRY & NEUROLOGY

## 2018-04-02 PROCEDURE — 95951 EEG EPILEPSY MONITOR 8-24 HR W/VIDEO: CPT | Performed by: PSYCHIATRY & NEUROLOGY

## 2018-04-02 PROCEDURE — 85025 COMPLETE CBC W/AUTO DIFF WBC: CPT | Performed by: PSYCHIATRY & NEUROLOGY

## 2018-04-02 RX ORDER — SIMVASTATIN 40 MG/1
40 TABLET, FILM COATED ORAL
Status: DISCONTINUED | OUTPATIENT
Start: 2018-04-03 | End: 2018-04-04 | Stop reason: HOSPADM

## 2018-04-02 RX ORDER — LOSARTAN POTASSIUM 50 MG/1
100 TABLET ORAL DAILY
Status: DISCONTINUED | OUTPATIENT
Start: 2018-04-03 | End: 2018-04-04 | Stop reason: HOSPADM

## 2018-04-02 RX ORDER — LORAZEPAM 2 MG/ML
2 INJECTION INTRAMUSCULAR
Status: DISCONTINUED | OUTPATIENT
Start: 2018-04-02 | End: 2018-04-04 | Stop reason: HOSPADM

## 2018-04-02 RX ORDER — TAMSULOSIN HYDROCHLORIDE 0.4 MG/1
0.4 CAPSULE ORAL DAILY
Status: DISCONTINUED | OUTPATIENT
Start: 2018-04-03 | End: 2018-04-04 | Stop reason: HOSPADM

## 2018-04-02 RX ORDER — CLOPIDOGREL BISULFATE 75 MG/1
75 TABLET ORAL DAILY
Status: DISCONTINUED | OUTPATIENT
Start: 2018-04-02 | End: 2018-04-04 | Stop reason: HOSPADM

## 2018-04-02 RX ORDER — SODIUM CHLORIDE 0.9 % (FLUSH) 0.9 %
5-10 SYRINGE (ML) INJECTION AS NEEDED
Status: DISCONTINUED | OUTPATIENT
Start: 2018-04-02 | End: 2018-04-04 | Stop reason: HOSPADM

## 2018-04-02 RX ORDER — ACETAMINOPHEN 325 MG/1
650 TABLET ORAL
Status: DISCONTINUED | OUTPATIENT
Start: 2018-04-02 | End: 2018-04-04 | Stop reason: HOSPADM

## 2018-04-02 RX ORDER — SODIUM CHLORIDE 0.9 % (FLUSH) 0.9 %
5-10 SYRINGE (ML) INJECTION EVERY 8 HOURS
Status: DISCONTINUED | OUTPATIENT
Start: 2018-04-02 | End: 2018-04-04 | Stop reason: HOSPADM

## 2018-04-02 RX ORDER — ENOXAPARIN SODIUM 100 MG/ML
40 INJECTION SUBCUTANEOUS EVERY 24 HOURS
Status: DISCONTINUED | OUTPATIENT
Start: 2018-04-02 | End: 2018-04-04 | Stop reason: HOSPADM

## 2018-04-02 RX ORDER — HYDROCHLOROTHIAZIDE 25 MG/1
25 TABLET ORAL DAILY
Status: DISCONTINUED | OUTPATIENT
Start: 2018-04-03 | End: 2018-04-04 | Stop reason: HOSPADM

## 2018-04-02 RX ORDER — ASPIRIN 81 MG/1
81 TABLET ORAL DAILY
Status: DISCONTINUED | OUTPATIENT
Start: 2018-04-03 | End: 2018-04-04 | Stop reason: HOSPADM

## 2018-04-02 RX ORDER — AMLODIPINE BESYLATE 5 MG/1
10 TABLET ORAL DAILY
Status: DISCONTINUED | OUTPATIENT
Start: 2018-04-03 | End: 2018-04-04 | Stop reason: HOSPADM

## 2018-04-02 RX ADMIN — Medication 10 ML: at 12:03

## 2018-04-02 RX ADMIN — Medication 10 ML: at 22:38

## 2018-04-02 RX ADMIN — CLOPIDOGREL BISULFATE 75 MG: 75 TABLET ORAL at 22:37

## 2018-04-02 RX ADMIN — ENOXAPARIN SODIUM 40 MG: 40 INJECTION SUBCUTANEOUS at 12:03

## 2018-04-02 NOTE — IP AVS SNAPSHOT
Summary of Care Report The Summary of Care report has been created to help improve care coordination. Users with access to [employer] or [address] (Web-based application) may access additional patient information including the Discharge Summary. If you are not currently a [address] user and need more information, please call the number listed below in the [place] [address] section and ask to be connected with Medical Records. Facility Information Name Address Phone [address] [facility] [phone] Patient Information Patient Name Sex  Robert Acevedo ([id number]) Male 1940 Discharge Information Admitting Provider Service Area Unit Santos Penny MD / [phone] [address] [facility] / [phone] Discharge Provider Discharge Date/Time Discharge Disposition Destination (none) 2018 Morning (Pending) AHR (none) Patient Language Language ENGLISH [13] Hospital Problems as of 2018  Reviewed: 2018  3:45 PM by Evelyn Chi NP Class Noted - Resolved Last Modified POA Active Problems Abnormal involuntary movements  2018 - Present 2018 by Santos Penny MD Yes Entered by Santos Penny MD  
  * (Principal)Sleep myoclonus  2018 - Present 2018 by Santos Penny MD Yes Entered by Santos Penny MD  
  
Non-Hospital Problems as of 2018  Reviewed: 2018  3:45 PM by Evelyn Chi NP Class Noted - Resolved Last Modified Active Problems Abscess of left hand excluding fingers and thumb  2011 - Present 2011 Entered by Mukul Haines NP You are allergic to the following No active allergies Current Discharge Medication List  
  
CONTINUE these medications which have NOT CHANGED Dose & Instructions Dispensing Information Comments  
 aspirin delayed-release 81 mg tablet Take  by mouth daily. Refills:  0  
   
 clopidogrel 75 mg Tab Commonly known as:  PLAVIX Take  by mouth. Refills:  0  
   
 FISH OIL PO Take  by mouth. Refills:  0  
   
 FLOMAX 0.4 mg capsule Generic drug:  tamsulosin Dose:  0.4 mg Take 0.4 mg by mouth daily. Refills:  0  
   
 simvastatin 40 mg tablet Commonly known as:  ZOCOR Take  by mouth nightly. Refills:  0  
   
 TRIBENZOR 40-10-25 mg Tab Generic drug:  Olmesartan-amLODIPine-HCTZ TAKE 1 TABLET BY MOUTH EVERY DAY Refills:  1 Follow-up Information Follow up With Details Comments Contact Info Phys Other, MD   Patient can only remember the practice name and not the physician Discharge Instructions None Chart Review Routing History Recipient Method [facility]  
Phone: [phone] In [facility] amb office visit enc summ w/hx Kaye Metzger NP [[id number]] 2/1/2012 10:31 AM 02/01/2012 Maggie Crisostomo MD  
Fax: [phone] Phone: [phone] Fax Provider Comm Report Tomas Bailey [[id number]] 2/23/2012  2:24 PM 02/22/2012 Maggie Crisostomo MD  
Fax: [phone] Phone: [phone] Fax Provider Comm Report Jazmine Perdomo, [address] [place] [address] [[id number]] 1/28/2013  1:32 PM 01/23/2013

## 2018-04-02 NOTE — PROGRESS NOTES
1930 Bedside shift change report given to Soy Simmons RN (oncoming nurse) by Sahil Blanco RN (offgoing nurse). Report included the following information SBAR, Kardex, Intake/Output, MAR, Accordion, Recent Results and Cardiac Rhythm NSR/SB.

## 2018-04-02 NOTE — IP AVS SNAPSHOT
[address] 
[phone] Patient: Araceli Sanchez MRN: [id number] XPE:[id number] About your hospitalization You were admitted on:  April 2, 2018 You last received care in the:  [facility] 6S NEURO-SCI TELE You were discharged on:  April 4, 2018 Why you were hospitalized Your primary diagnosis was:  Sleep Myoclonus Your diagnoses also included:  Abnormal Involuntary Movements Follow-up Information Follow up With Details Comments Contact Info Breann Osborne, MD   Patient can only remember the practice name and not the physician Discharge Orders None A check dorian indicates which time of day the medication should be taken. My Medications CONTINUE taking these medications Instructions Each Dose to Equal  
 Morning Noon Evening Bedtime  
 aspirin delayed-release 81 mg tablet Your last dose was: Your next dose is: Take  by mouth daily. clopidogrel 75 mg Tab Commonly known as:  PLAVIX Your last dose was: Your next dose is: Take  by mouth. FISH OIL PO Your last dose was: Your next dose is: Take  by mouth. FLOMAX 0.4 mg capsule Generic drug:  tamsulosin Your last dose was: Your next dose is: Take 0.4 mg by mouth daily. 0.4 mg  
    
   
   
   
  
 simvastatin 40 mg tablet Commonly known as:  ZOCOR Your last dose was: Your next dose is: Take  by mouth nightly. TRIBENZOR 40-10-25 mg Tab Generic drug:  Olmesartan-amLODIPine-HCTZ Your last dose was: Your next dose is: TAKE 1 TABLET BY MOUTH EVERY DAY Discharge Instructions None MyChart Announcement We are excited to announce that we are making your provider's discharge notes available to you in Engrade. You will see these notes when they are completed and signed by the physician that discharged you from your recent hospital stay. If you have any questions or concerns about any information you see in Engrade, please call the Health Information Department where you were seen or reach out to your Primary Care Provider for more information about your plan of care. Introducing [facility]! [employer] introduces Engrade patient portal. Now you can access parts of your medical record, email your doctor's office, and request medication refills online. 1. In your internet browser, go to [email or web address] 2. Click on the First Time User? Click Here link in the Sign In box. You will see the New Member Sign Up page. 3. Enter your Engrade Access Code exactly as it appears below. You will not need to use this code after youve completed the sign-up process. If you do not sign up before the expiration date, you must request a new code. · Engrade Access Code: [email or web address] Expires: 4/5/2018  3:16 PM 
 
4. Enter the last four digits of your Social Security Number (xxxx) and Date of Birth (mm/dd/yyyy) as indicated and click Submit. You will be taken to the next sign-up page. 5. Create a Engrade ID. This will be your Engrade login ID and cannot be changed, so think of one that is secure and easy to remember. 6. Create a Engrade password. You can change your password at any time. 7. Enter your Password Reset Question and Answer. This can be used at a later time if you forget your password. 8. Enter your e-mail address. You will receive e-mail notification when new information is available in [email or web address]. 9. Click Sign Up. You can now view and download portions of your medical record.  
10. Click the Download Summary menu link to download a portable copy of your medical information. If you have questions, please visit the Frequently Asked Questions section of the [facility] [employer] website. Remember, [employer] is NOT to be used for urgent needs. For medical emergencies, dial 911. Now available from your iPhone and Android! Introducing Bruno Wang As a [employer] patient, I wanted to make you aware of our electronic visit tool called Bruno Wang. [employer] 24/7 allows you to connect within minutes with a medical provider 24 hours a day, seven days a week via a mobile device or tablet or logging into a secure website from your computer. You can access Bruno Wang from anywhere in the [place]. A virtual visit might be right for you when you have a simple condition and feel like you just dont want to get out of bed, or cant get away from work for an appointment, when your regular [employer] provider is not available (evenings, weekends or holidays), or when youre out of town and need minor care. Electronic visits cost only $49 and if the [employer] 24/7 provider determines a prescription is needed to treat your condition, one can be electronically transmitted to a nearby pharmacy*. Please take a moment to enroll today if you have not already done so. The enrollment process is free and takes just a few minutes. To enroll, please download the [employer] 24/7 tanisha to your tablet or phone, or visit [email or web address] to enroll on your computer. And, as an [address] [place] [address] patient with a [employer] account, the results of your visits will be scanned into your electronic medical record and your primary care provider will be able to view the scanned results. We urge you to continue to see your regular [employer] provider for your ongoing medical care.   And while your primary care provider may not be the one available when you seek a Bruno Wang virtual visit, the peace of mind you get from getting a real diagnosis real time can be priceless. For more information on Bruno Wang, view our Frequently Asked Questions (FAQs) at [email or web address]. Sincerely, 
 
Csasius Valentin MD 
Chief Medical Officer [address] *:  certain medications cannot be prescribed via Bruno Wang Providers Seen During Your Hospitalization Provider Specialty Primary office phone Juventino Alvarado MD Neurology [phone] Your Primary Care Physician (PCP) Primary Care Physician Office Phone Office Fax OTHER, PHYS ** None ** ** None ** You are allergic to the following No active allergies Recent Documentation Height Weight BMI Smoking Status 1.803 m 111.1 kg 34.16 kg/m2 Never Smoker Emergency Contacts Name Discharge Info Relation Home Work Mobile Melanie Vega DISCHARGE CAREGIVER [3] Friend [5] [phone] Patient Belongings The following personal items are in your possession at time of discharge: 
                   Clothing: Scott Galvan, Footwear Please provide this summary of care documentation to your next provider. Signatures-by signing, you are acknowledging that this After Visit Summary has been reviewed with you and you have received a copy. Patient Signature:  ____________________________________________________________ Date:  ____________________________________________________________  
  
Lala Contreras Provider Signature:  ____________________________________________________________ Date:  ____________________________________________________________

## 2018-04-02 NOTE — IP AVS SNAPSHOT
[address] 
[phone] Patient: Nabeel Wren MRN: [id number] BZR:9/37/5157 A check dorian indicates which time of day the medication should be taken. My Medications CONTINUE taking these medications Instructions Each Dose to Equal  
 Morning Noon Evening Bedtime  
 aspirin delayed-release 81 mg tablet Your last dose was: Your next dose is: Take  by mouth daily. clopidogrel 75 mg Tab Commonly known as:  PLAVIX Your last dose was: Your next dose is: Take  by mouth. FISH OIL PO Your last dose was: Your next dose is: Take  by mouth. FLOMAX 0.4 mg capsule Generic drug:  tamsulosin Your last dose was: Your next dose is: Take 0.4 mg by mouth daily. 0.4 mg  
    
   
   
   
  
 simvastatin 40 mg tablet Commonly known as:  ZOCOR Your last dose was: Your next dose is: Take  by mouth nightly. TRIBENZOR 40-10-25 mg Tab Generic drug:  Olmesartan-amLODIPine-HCTZ Your last dose was: Your next dose is:  TAKE 1 TABLET BY MOUTH EVERY DAY

## 2018-04-02 NOTE — PROGRESS NOTES
1050 Pt arrived to room via wheelchair by pt transport. Pt accompanied by his spouse. Pt ambulatory in room without difficulties. Consent signed by pt. VSS. EEG technician applying electrodes. Call bell and aura button explained to pt and placed within reach. PIV established and labs sent. Plan of the day reviewed with pt and spouse.

## 2018-04-02 NOTE — INTERDISCIPLINARY ROUNDS
IDR/SLIDR Summary          Patient: Zachery Sandhu MRN: [id number]    Age: 69 y.o. YOB: 1940 Room/Bed: [facility]   Admit Diagnosis: seizures/emu  Principal Diagnosis: <principal problem not specified>   Goals: Safety  Readmission: NO  Quality Measure:   VTE Prophylaxis: Pending MD orders  Influenza Vaccine screening completed? YES  Pneumococcal Vaccine screening completed? NO  Mobility needs: Yes   Nutrition plan:Yes  Consults:    Financial concerns:No  Escalated to CM? NO  RRAT Score:    Interventions:  Testing due for pt today?  NO  LOS: 0 days Expected length of stay 3-4 days  Discharge plan: Home   PCP: Breann Osborne MD  Transportation needs: No    Days before discharge:two or more days before discharge   Discharge disposition: Home    Signed:     Glen Butler RN  4/2/2018  11:17 AM

## 2018-04-03 PROCEDURE — 65660000000 HC RM CCU STEPDOWN

## 2018-04-03 PROCEDURE — 74011250637 HC RX REV CODE- 250/637: Performed by: PSYCHIATRY & NEUROLOGY

## 2018-04-03 PROCEDURE — 74011250636 HC RX REV CODE- 250/636: Performed by: PSYCHIATRY & NEUROLOGY

## 2018-04-03 PROCEDURE — 95951 EEG EPILEPSY MONITOR 8-24 HR W/VIDEO: CPT | Performed by: PSYCHIATRY & NEUROLOGY

## 2018-04-03 RX ADMIN — ENOXAPARIN SODIUM 40 MG: 40 INJECTION SUBCUTANEOUS at 15:55

## 2018-04-03 RX ADMIN — AMLODIPINE BESYLATE 10 MG: 5 TABLET ORAL at 09:25

## 2018-04-03 RX ADMIN — Medication 1 CAPSULE: at 09:26

## 2018-04-03 RX ADMIN — CLOPIDOGREL BISULFATE 75 MG: 75 TABLET ORAL at 21:06

## 2018-04-03 RX ADMIN — SIMVASTATIN 40 MG: 40 TABLET, FILM COATED ORAL at 09:26

## 2018-04-03 RX ADMIN — LOSARTAN POTASSIUM 100 MG: 50 TABLET ORAL at 09:26

## 2018-04-03 RX ADMIN — Medication 10 ML: at 06:49

## 2018-04-03 RX ADMIN — HYDROCHLOROTHIAZIDE 25 MG: 25 TABLET ORAL at 09:25

## 2018-04-03 RX ADMIN — ASPIRIN 81 MG: 81 TABLET, COATED ORAL at 09:26

## 2018-04-03 RX ADMIN — Medication 10 ML: at 15:56

## 2018-04-03 RX ADMIN — TAMSULOSIN HYDROCHLORIDE 0.4 MG: 0.4 CAPSULE ORAL at 09:26

## 2018-04-03 RX ADMIN — Medication 10 ML: at 21:06

## 2018-04-03 NOTE — PROGRESS NOTES
Physical Therapy Screening:  Services are not indicated at this time. [facility] based on results obtained during the nursing admission assessment. The patients chart was reviewed and the patient is not appropriate for a skilled therapy evaluation at this time. Please consult physical therapy if any therapy needs arise. Thank you.     Xuan Ortiz, PT

## 2018-04-03 NOTE — PROGRESS NOTES
Neurology Progress Note     NAME: Kory Lr   :  1940   MRN:  [id number]   DATE:  4/3/2018    Assessment:     Active Problems:    Abnormal involuntary movements (2018)      Pt is a 69yo RH male with spells of quick jerks that migrate around his body, typically occurring while he is supine, at times waking him. The patient does have risk factors for seizure including head injuries as a boxer and history of stroke. One event on EMU Day 1, no obvious clinical change or EEG correlate on review of EEG. Full EMU report to follow. UDS, CMP, and CBC were unremarkable. Plan:   -Will monitor for one more day in an attempt to capture a more intense spell  -Suggested he stay in the bed as much as he can tolerate today in order to try to capture a spell  -Anticipate d/c in AM    Subjective:   Pt had one spell yesterday of twitching, but reports this was not the typical intense spell that he has been having. Again mentions that this often occurs when he supine. Notes he is having difficulty just sitting here, he is used to moving around and being active.      Objective:   Chart reviewed since last seen  Current Facility-Administered Medications   Medication Dose Route Frequency    sodium chloride (NS) flush 5-10 mL  5-10 mL IntraVENous Q8H    sodium chloride (NS) flush 5-10 mL  5-10 mL IntraVENous PRN    LORazepam (ATIVAN) injection 2 mg  2 mg IntraVENous Q5MIN PRN    LORazepam (ATIVAN) injection 2 mg  2 mg IntraMUSCular Q5MIN PRN    acetaminophen (TYLENOL) tablet 650 mg  650 mg Oral Q4H PRN    enoxaparin (LOVENOX) injection 40 mg  40 mg SubCUTAneous Q24H    clopidogrel (PLAVIX) tablet 75 mg  75 mg Oral DAILY    fish oil-omega-3 fatty acids 340-1,000 mg capsule 1 Cap  1 Cap Oral DAILY    simvastatin (ZOCOR) tablet 40 mg  40 mg Oral QHS    tamsulosin (FLOMAX) capsule 0.4 mg  0.4 mg Oral DAILY    aspirin delayed-release tablet 81 mg  81 mg Oral DAILY    losartan (COZAAR) tablet 100 mg  100 mg Oral DAILY    And    amLODIPine (NORVASC) tablet 10 mg  10 mg Oral DAILY    And    hydroCHLOROthiazide (HYDRODIURIL) tablet 25 mg  25 mg Oral DAILY       Visit Vitals    /71 (BP 1 Location: Right arm, BP Patient Position: At rest)    Pulse 66    Temp 98.2 °F (36.8 °C)    Resp 12    Ht 5' 11\" (1.803 m)    Wt 111.1 kg (244 lb 14.4 oz)    SpO2 94%    BMI 34.16 kg/m2     Temp (24hrs), Av.3 °F (36.8 °C), Min:97.9 °F (36.6 °C), Max:98.6 °F (37 °C)            Physical Exam:  General: Well developed well nourished patient in no apparent distress. Neurological Exam:  Mental Status: Oriented to time, place and person. Speech and language intact. Attention and fund of knowledge appropriate. Normal recent and remote memory. Cranial Nerves:   EOMI,  Facial movement is symmetric. No ptosis. Hearing is intact. Motor:     Reflexes:      Sensory:      Gait:  Steady routine gait   Cerebellar:           Lab Review   Recent Results (from the past 24 hour(s))   DRUG SCREEN, URINE    Collection Time: 18  5:20 PM   Result Value Ref Range    AMPHETAMINES NEGATIVE  NEG      BARBITURATES NEGATIVE  NEG      BENZODIAZEPINES NEGATIVE  NEG      COCAINE NEGATIVE  NEG      METHADONE NEGATIVE  NEG      OPIATES NEGATIVE  NEG      PCP(PHENCYCLIDINE) NEGATIVE  NEG      THC (TH-CANNABINOL) NEGATIVE  NEG      Drug screen comment (NOTE)        Additional comments:  I have reviewed the patient's new clinical lab test results. Care Plan discussed with:  Patient x   Family    RN    Care Manager    Technologist:  x     Signed: Bina Kruse MD
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
## 2018-04-04 VITALS
RESPIRATION RATE: 15 BRPM | TEMPERATURE: 98.1 F | BODY MASS INDEX: 34.28 KG/M2 | HEART RATE: 67 BPM | SYSTOLIC BLOOD PRESSURE: 140 MMHG | OXYGEN SATURATION: 96 % | WEIGHT: 244.9 LBS | DIASTOLIC BLOOD PRESSURE: 65 MMHG | HEIGHT: 71 IN

## 2018-04-04 PROBLEM — G25.3 SLEEP MYOCLONUS: Status: ACTIVE | Noted: 2018-04-04

## 2018-04-04 PROCEDURE — 95951 EEG EPILEPSY MONITOR 8-24 HR W/VIDEO: CPT | Performed by: PSYCHIATRY & NEUROLOGY

## 2018-04-04 PROCEDURE — 74011250637 HC RX REV CODE- 250/637: Performed by: PSYCHIATRY & NEUROLOGY

## 2018-04-04 RX ADMIN — HYDROCHLOROTHIAZIDE 25 MG: 25 TABLET ORAL at 09:01

## 2018-04-04 RX ADMIN — Medication 1 CAPSULE: at 09:01

## 2018-04-04 RX ADMIN — AMLODIPINE BESYLATE 10 MG: 5 TABLET ORAL at 09:01

## 2018-04-04 RX ADMIN — SIMVASTATIN 40 MG: 40 TABLET, FILM COATED ORAL at 09:01

## 2018-04-04 RX ADMIN — LOSARTAN POTASSIUM 100 MG: 50 TABLET ORAL at 09:02

## 2018-04-04 RX ADMIN — ASPIRIN 81 MG: 81 TABLET, COATED ORAL at 09:01

## 2018-04-04 RX ADMIN — TAMSULOSIN HYDROCHLORIDE 0.4 MG: 0.4 CAPSULE ORAL at 09:01

## 2018-04-04 NOTE — PROGRESS NOTES
Pharmacist Discharge Medication Reconciliation    Discharging Provider: Dr. Les Haile PMH:   Past Medical History:   Diagnosis Date    COPD (chronic obstructive pulmonary disease) ([address]     Diabetes mellitus     Essential hypertension     Headache(784.0)     Sarcoid     Stroke [facility])      Chief Complaint for this Admission: No chief complaint on file. Allergies: Review of patient's allergies indicates no known allergies. Discharge Medications:   Current Discharge Medication List        CONTINUE these medications which have NOT CHANGED    Details   simvastatin (ZOCOR) 40 mg tablet Take  by mouth nightly. clopidogrel (PLAVIX) 75 mg tab Take  by mouth. tamsulosin (FLOMAX) 0.4 mg capsule Take 0.4 mg by mouth daily. DOCOSAHEXANOIC ACID/EPA (FISH OIL PO) Take  by mouth. TRIBENZOR 40-10-25 mg tab TAKE 1 TABLET BY MOUTH EVERY DAY  Refills: 1      aspirin delayed-release 81 mg tablet Take  by mouth daily. The patient's chart, MAR and AVS were reviewed by Glen Sultana.

## 2018-04-04 NOTE — DISCHARGE SUMMARY
Epilepsy Monitoring Unit Neurology Discharge Summary     Patient ID:  Yasmin Chris  [id number]  72 y.o.  1940    Admit Date: 4/2/2018    Discharge Date: 4/4/2018      Admission Diagnoses: Involuntary movements    Discharge Diagnoses: Physiological myoclonus of sleep (hypnic jerks)    Disposition: home    Discharged Condition: good    Hospital Summary: This is a 27-year-old right-handed male with spells of quick jerks that migrate around his body, typically occurring while he is supine, at times waking him, ongoing for several years, worsened since stroke 6 months ago. The patient was admitted to epilepsy monitoring unit to capture these spells in order to exclude epilepsy as an etiology. The patient does have risk factors for seizure including head injuries as a boxer and history of stroke. Pt had one minor spell of jerking during drowsiness on day 1 in EMU. Pt reported that this was not his typical strong spell of jerking. On EEG intermittent muscle artifact was seen consistent with muscle twitches. There were no EEG changes associated with this activity. There were no interictal epileptiform discharges or electrographic seizures seen. For full details, see EMU report to follow. While a \"strong\" spell was not captured during his EMU admission, his history and spell that was seen is most c/w physiological myoclonus of sleep. Lack of any interictal EEG changes during this 3 days in the EMU, as well as the fact that he has never had a shaking seizure or spell of VANIA/LOC during the several years he has had these myoclonic jerks, is added evidence against epilepsy as an etiology. He is aware of his MRI C-spine findings and has a consultation with Dr. Carolynn Sanz in [facility] on 4/6/18. He remained stable throughout his admission.      Patient Active Problem List    Diagnosis Date Noted    Sleep myoclonus 04/04/2018    Abnormal involuntary movements 04/02/2018    Abscess of left hand excluding fingers and thumb 09/11/2011     Past Medical History:   Diagnosis Date    [facility] [address] [facility]     Diabetes mellitus     Essential hypertension     Headache(784.0)     Sarcoid     Stroke ([facility] [address]       Family History   Problem Relation Age of Onset    Seizures Brother     Heart Disease Brother     Cancer Brother      x 2, prostate    Stroke Mother     Cancer Father       Social History   Substance Use Topics    Smoking status: Never Smoker    Smokeless tobacco: Never Used    Alcohol use No     Past Surgical History:   Procedure Laterality Date    HX MOHS PROCEDURES      left      Prior to Admission medications    Medication Sig Start Date End Date Taking? Authorizing Provider   simvastatin (ZOCOR) 40 mg tablet Take  by mouth nightly. Yes Historical Provider   clopidogrel (PLAVIX) 75 mg tab Take  by mouth. Yes Historical Provider   tamsulosin (FLOMAX) 0.4 mg capsule Take 0.4 mg by mouth daily. Yes Historical Provider   DOCOSAHEXANOIC ACID/EPA (FISH OIL PO) Take  by mouth. Yes Historical Provider   TRIBENZOR 40-10-25 mg tab TAKE 1 TABLET BY MOUTH EVERY DAY 10/24/17  Yes Historical Provider   aspirin delayed-release 81 mg tablet Take  by mouth daily. Yes Historical Provider     No Known Allergies     Discharge Exam:  Exam:  Visit Vitals    /71 (BP 1 Location: Left arm, BP Patient Position: At rest)    Pulse 78    Temp 97.8 °F (36.6 °C)    Resp 16    Ht 5' 11\" (1.803 m)    Wt 111.1 kg (244 lb 14.4 oz)    SpO2 96%    BMI 34.16 kg/m2     Gen: WNWD obese male in NAD  Neuro: A&O x 3, no dysarthria or aphasia  CN II-XII: EOMI, face symmetric, no Ptosis          Patient Instructions:   Current Discharge Medication List      CONTINUE these medications which have NOT CHANGED    Details   simvastatin (ZOCOR) 40 mg tablet Take  by mouth nightly. clopidogrel (PLAVIX) 75 mg tab Take  by mouth. tamsulosin (FLOMAX) 0.4 mg capsule Take 0.4 mg by mouth daily. DOCOSAHEXANOIC ACID/EPA (FISH OIL PO) Take  by mouth. TRIBENZOR 40-10-25 mg tab TAKE 1 TABLET BY MOUTH EVERY DAY  Refills: 1      aspirin delayed-release 81 mg tablet Take  by mouth daily. Activity: Activity as tolerated  Diet: Cardiac Diet    Follow-up as planned in neurology    Signed:   Vamshi Vernon MD  4/4/2018  10:43 AM

## 2018-04-04 NOTE — INTERDISCIPLINARY ROUNDS
IDR/SLIDR Summary          Patient: Elijah Kelsey MRN: [id number]    Age: 69 y.o. YOB: 1940 Room/Bed: [facility]   Admit Diagnosis: seizures/emu  Abnormal involuntary movements  Principal Diagnosis: <principal problem not specified>   Goals: Capture spells  Readmission: NO  Quality Measure: Not applicable  VTE Prophylaxis: Chemical  Influenza Vaccine screening completed? YES  Pneumococcal Vaccine screening completed? NO  Mobility needs: No   Nutrition plan:No  Consults:Case Management    Financial concerns:No  Escalated to CM? YES  RRAT Score: 10     Testing due for pt today?  YES  LOS: 2 days Expected length of stay 2-4 days  Discharge plan: Home   PCP: Breann Osborne MD  Transportation needs: No    Days before discharge:two or more days before discharge   Discharge disposition: Home    Signed:     Marcelino Powers RN  4/4/2018  09:00 AM

## 2018-04-08 NOTE — PROCEDURES
[facility]  EEG    Tian Johnston  MR#: [id number]  : 1940  ACCOUNT #: [de-identified]   DATE OF SERVICE: 2018    EMU REPORT     DATE:  2018 through 2018     PROCEDURE:  24-hour video EEG monitoring. HISTORY OF PRESENT ILLNESS:  Briefly, this is a 50-year-old right-handed male with spells of quick jerks that migrate around his body, typically occurring while he is supine, at times waking him from sleep, ongoing for several years, worsened since stroke 6 months ago. He is admitted to the epilepsy monitoring unit in order to undergo 24-hour video EEG monitoring to capture these spells to help determine if these are seizures and guide management. MEDICATIONS:    1. Zocor 40 mg a day. 2.  Plavix 75 mg a day. 3.  Flomax 0.4 mg a day. 4.  Fish oil daily. 5.  Tribenzor 40-10-25 mg a day. 6.  Aspirin 81 mg daily. CONDITIONS:  This represents 3 days of EMU monitoring with continuous 24-hour video EEG for the purposes of spell detection, characterization, and localization. This is a 21-channel digital EEG recording with one channel devoted to limited EKG performed in accordance with the International 10-20 system. Photic stimulation was performed as an activating procedure. This study was done during states of wakefulness and sleep. The patient was continued on his home medications. He was not sleep deprived. This study began on 2018 at 11:44 and ended on 2018 at 10:10. I have reviewed this record in its entirety. DESCRIPTION:  Upon maximal arousal, the posterior dominant rhythm has a frequency of 9.5 Hz and an amplitude of 40uV, symmetric over the bilateral posterior derivations and attenuates with eye opening. Photic stimulation did not significantly alter the tracing. Normal sleep architecture is seen with stage II sleep recognized by the presence of symmetric vertex waves and sleep spindles.   There was rare left temporal rhythmic theta slowing seen lasting from 1-3 seconds consistent with RMTD, a normal variant. There were no other focal abnormalities, epileptiform discharges, or electrographic seizures seen. DAILY EEG ANALYSIS:    Day 1:  04/02/2018 at 11:44 - 04/03/2018 at 0601- there was 1 push button event at 14:43:44. The patient reported this as a mild twitching spell; on review of video, no clinical changes seen. On EEG, the patient is noted to be in drowsiness and stage I sleep with bursts of muscle artifact seen that is very brief and consistent with a myoclonic type jerk. There were no EEG correlates associated with this event. Day 2:   04/03/2018 at 0601 - 04/04/2018 at 0601- there were no push button events. Day 3:  04/04/2018 at 0601 - 04/04/2018 at 10:10 - there were no push button events. INTERPRETATION:  This is a normal video EEG recording with no epileptiform discharges, focal abnormalities, or electrographic seizures seen. Patient did have one push button event described as a mild spell of twitching, with muscle artifact seen on EEG and consistent with myoclonic jerk, without any other EEG correlates. Findings are consistent with physiological myoclonus of sleep. For full details of his hospitalization, please see separate discharge summary.       MD ANIBAL Duarte / TRENT  D: 04/08/2018 18:31     T: 04/08/2018 19:26  JOB #: [id number]

## 2018-05-14 NOTE — PROGRESS NOTES
[facility] [facility] [address] [place] [address] Drive  Phone: [phone] Fax: [phone]      Discharge Summary 2-15    Patient name: Mahesh Manning  : 1940  Provider#: [id number]  Referral source: Shahid Rosa MD      Medical/Treatment Diagnosis: Unspecified sequelae of cerebral infarction [I69.30]  Other symptoms and signs involving the musculoskeletal system [R29.898]  Unspecified abnormal involuntary movements [R25.9]  Unspecified disturbances of skin sensation [R20.9]     Prior Hospitalization: see medical history     Comorbidities: See Plan of Care  Prior Level of Function: See Plan of Care  Medications: Verified on Patient Summary List    Start of Care: 18      Onset Date:2017   Visits from Start of Care: 12     Missed Visits: 0  Reporting Period : 18 to 3/29/18    Assessment/Summary of care: Mr. Ileana Valencia did very well with PT with a significant improvement in sensation and strength at the L UE. He achieved 2/3 long term goals and no longer required PT services. Short Term Goals: To be accomplished in 8 treatments:  1. Patient will improve general grasp strength by 10# to allow for improved ability to carry a heavy bag of groceries. Met  2. Patient will improve 3 point pinch strength by 5# to allow for improved ability to  his keys. Met  3. Patient will be able to carry a 20# object in his left hand for 50 ft. Met  Long Term Goals: To be accomplished in 16 treatments:  1. Patient will be able to lift 5# over his head with the L UE. Met  2. Patient will be able to open a jar with the L hand with no limitation. Not met  3. Patient will be able to carry a 30# objects in his left hand for 50 ft. Met    G-Codes (GP)  Mobility   [id number] Goal  CJ= 20-39%  D/C  CJ= 20-39%    The severity rating is based on clinical judgment and the FOTO Score score.     RECOMMENDATIONS:  [x]Discontinue therapy: [x]Patient has reached or is progressing toward set goals     []Patient is non-compliant or has abdicated     []Due to lack of appreciable progress towards set goals     []Other  Margaux Handing, PT , DPT, OCS, Cert.  DN   5/14/2018 12:17 PM

## 2019-11-27 ENCOUNTER — HOSPITAL ENCOUNTER (OUTPATIENT)
Dept: PHYSICAL THERAPY | Age: 79
Discharge: HOME | End: 2019-11-27
Payer: MEDICARE

## 2019-11-27 PROCEDURE — 97140 MANUAL THERAPY 1/> REGIONS: CPT | Performed by: PHYSICAL THERAPIST

## 2019-11-27 PROCEDURE — 97014 ELECTRIC STIMULATION THERAPY: CPT | Performed by: PHYSICAL THERAPIST

## 2019-11-27 PROCEDURE — 97110 THERAPEUTIC EXERCISES: CPT | Performed by: PHYSICAL THERAPIST

## 2019-11-27 PROCEDURE — 97162 PT EVAL MOD COMPLEX 30 MIN: CPT | Performed by: PHYSICAL THERAPIST

## 2019-11-27 NOTE — PROGRESS NOTES
PT INITIAL EVALUATION NOTE - [facility] 2-15    Patient Name: Maxi Cantu  Date:2019  : 1940  [x]  Patient  Verified  Payor: Wilmer Gupta / Plan: [place] & [place] / Product Type: Medicare /    In time:1:30 PM  Out time:2:30 PM  Total Treatment Time (min): 60  Total Timed Codes (min): 30  1:1 Treatment Time ( only): 60   Visit #: 1     Treatment Area: Neck pain [M54.2]  Left shoulder pain [M25.512]    SUBJECTIVE  Pain Level (0-10 scale): 3/10  Any medication changes, allergies to medications, adverse drug reactions, diagnosis change, or new procedure performed?: [] No    [x] Yes (see summary sheet for update)  Subjective:    L cervical radiculopathy  PLOF: No limitations raising the arm overhead  Mechanism of Injury: Gradual worsening over the past year when raising his arm overhead. He also has noticed neck stiffness and pain along the left shoulder blade. Previous Treatment/Compliance: He was evaluated by his PCP, who referred him to this location for dry needling. 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 He was seen at this clinic 2018 for L hand weakness/coordination and did well with PT. Work Hx: Retired  Living Situation: Lives an assisted living complex with wife  Pt Goals: to reduce pain and improve overhead mobility with the L UE  Barriers: chronicity  Motivation: motivated  Substance use: none  FABQ Score: n/a  Cognition: A & O x 4        OBJECTIVE/EXAMINATION  Posture:  Mild kyphotic posture in sitting  Other Observations:    L shoulder elevated/retracted compared to R  Palpation: TTP along the L upper trapezius  Joint Mobility: Hypomobile throughout the cervical spine, no exacerbation of UE symptoms        Cervical AROM (degrees):         R  L    Flexion    35        Extension   20        Side Bending   30  35        Rotation   40  55        Shoulder AROM (degrees):         R  L    Flexion    160  100        Extension           Abduction   160  90          ER  IR                     UPPER QUARTER   MUSCLE STRENGTH  SUAREZ       R  L      C4 Upper Trapezius 5/5  5/5  0 - No Contraction  C5 Deltoid/Biceps 5/5  3+/5 At both the biceps, deltoid ,and infraspinatus  1 - Trace   C6 Wrist Ext  5/5  4/5    2 - Poor   C7 Triceps  5/5  4/5   3 - Fair    C8 Thumb Ext  5/5  4/5  4 - Good   T1 Hand Inst  5/5  4/5    5 - Normal                  Hand Held Dynamometry: R: 60.5#  L: 39.4#  Neurological: Sensation: Intact  Special Tests:    Spurlings:Negative   Upper Limb Neural Tension:    Median:Negative    Radial:Negative    Ulnar:Negative        Modality rationale: decrease pain and increase tissue extensibility to improve the patients ability to raise his arm overhead without pain   Min Type Additional Details   10 [x] Estim: []Att   []Unatt        []TENS instruct                  []IFC  []Premod   []NMES                     [x]Other: asymmetrical, biphasic, 300 usec, 2 Hz []w/US   []w/ice   []w/heat  Position: prone  Location:L upper trap, L C5 paraspinals    []  Traction: [] Cervical       []Lumbar                       [] Prone          []Supine                       []Intermittent   []Continuous Lbs:  [] before manual  [] after manual  []w/heat    []  Ultrasound: []Continuous   [] Pulsed at:                           []1MHz   []3MHz Location:  W/cm2:    [] Paraffin         Location:   []w/heat    []  Ice     []  Heat  []  Ice massage Position:  Location:    []  Laser  []  Other: Position:  Location:      []  Vasopneumatic Device Pressure:       [] lo [] med [] hi   Temperature:      [x] Skin assessment post-treatment:  [x]intact []redness- no adverse reaction    []redness  adverse reaction:     10 min Therapeutic Exercise:  [x] See flow sheet :   Rationale: increase ROM, increase strength and improve coordination to improve the patients ability to look over his shoulder without pain    20 min Manual Therapy: DN was performed in prone to the left levator scapulae at the cervical attachment (C4-C5), mid muscle belly,and at the scapular attachment. 4x 40 mm x .30 mm needles were used throughout with pistoning performed to elicit LTR's and e-stim utilized to allow greater activation of the DNIC pathway.     DN was performed prior to the following manual therapy techniques to allow for greater ROM and increased tissue extensibility:  STM to the L upper trapezius  Passive stretching of the L levator scapulae    Rationale: decrease pain, increase ROM, increase tissue extensibility and decrease trigger points to improve the patients ability to reach overhead without pain      With   [] TE   [] TA   [] neuro   [] other: Patient Education: [x] Review HEP    [] Progressed/Changed HEP based on:   [] positioning   [] body mechanics   [] transfers   [] heat/ice application    [] other:      Other Objective/Functional Measures:   Significant LTR's associated with mid-muscle belly of levator scapulae, but no complications with DN    Pain Level (0-10 scale) post treatment: 0    ASSESSMENT/Changes in Function:     [x]  See Plan of Ian Ricks, PT 11/27/2019

## 2019-11-27 NOTE — PROGRESS NOTES
[address] [place] [address] [address]  Phone: [phone]  Fax: [phone]    Plan of Care/Statement of Necessity for Physical Therapy Services  2-15    Patient name: Jonathan Dash  : 1940  Provider#: [id number]  Referral source: Lionel Lieberman MD      Medical/Treatment Diagnosis: Neck pain [M54.2]  Left shoulder pain [M25.512]     Prior Hospitalization: see medical history     Comorbidities: Previous CVA with L hemiparesis  Prior Level of Function: see initial eval  Medications: Verified on Patient Summary List  Start of Care: 19      Onset Date: Gradual onset over    The Plan of Care and following information is based on the information from the initial evaluation. Assessment/ key information: Patient presents with L UE weakness, specifically at the L deltoid/biceps/rotator cuff, that is greater than other musculature affected by previous hemiparesis. He also demonstrates neck pain with decreased cervical ROM and positive testing for cervical radiculopathy. He tolerated dry needling well today and will look to gradually improve ROM while strengthening the L UE. Evaluation Complexity History MEDIUM  Complexity : 1-2 comorbidities / personal factors will impact the outcome/ POC ; Examination MEDIUM Complexity : 3 Standardized tests and measures addressing body structure, function, activity limitation and / or participation in recreation  ;Presentation MEDIUM Complexity : Evolving with changing characteristics  ; Clinical Decision Making MEDIUM Complexity : FOTO score of 26-74  Overall Complexity Rating: MEDIUM    Problem List: pain affecting function, decrease ROM, decrease strength, decrease ADL/ functional abilitiies, decrease activity tolerance, decrease flexibility/ joint mobility and decrease transfer abilities   Treatment Plan may include any combination of the following: Therapeutic exercise, Therapeutic activities, Neuromuscular re-education, Physical agent/modality, Manual therapy, Patient education, Self Care training, Functional mobility training, Home safety training and Other: dry needling  Patient / Family readiness to learn indicated by: asking questions, trying to perform skills and interest  Persons(s) to be included in education: patient (P)  Barriers to Learning/Limitations: None  Patient Goal (s): I want to be able to use this arm with less weakness.   Patient Self Reported Health Status: excellent  Rehabilitation Potential: excellent    Short Term Goals: To be accomplished in 8 treatments:  1. Patient will be able to demonstrate >60 degrees of AROM cervical rotation in either direction with <2/10 neck pain reported. 2. Patient will be able to demonstrate >120 degrees of AROM elevation with <2/10 neck pain reported. 3. Patient will be able to demonstrate >4/5 biceps MMT strength to allow for greater ability to lift a bag of groceries from the floor. Long Term Goals: To be accomplished in 16 treatments:  1. Patient will be able to lift 1# from waist to chest height with the L UE.  2. Patient will be able to demonstrate >50# of L  strength. 3. Patient will be able to sleep through the night without being woken up by neck pain. Frequency / Duration: Patient to be seen 2 times per week for 8 weeks. Patient/ Caregiver education and instruction: self care, activity modification and exercises    [x]  Plan of care has been reviewed with PTA        Certification Period: 12/2/19 - 3/2/19    Nereyda Martinez, PT 11/27/2019     ________________________________________________________________________    I certify that the above Therapy Services are being furnished while the patient is under my care. I agree with the treatment plan and certify that this therapy is necessary.     [de-identified] Signature:____________________  Date:____________Time: _________

## 2019-12-04 ENCOUNTER — HOSPITAL ENCOUNTER (OUTPATIENT)
Dept: PHYSICAL THERAPY | Age: 79
Discharge: HOME | End: 2019-12-04
Payer: MEDICARE

## 2019-12-04 PROCEDURE — 97140 MANUAL THERAPY 1/> REGIONS: CPT | Performed by: PHYSICAL THERAPIST

## 2019-12-04 PROCEDURE — 97014 ELECTRIC STIMULATION THERAPY: CPT | Performed by: PHYSICAL THERAPIST

## 2019-12-04 PROCEDURE — 97110 THERAPEUTIC EXERCISES: CPT | Performed by: PHYSICAL THERAPIST

## 2019-12-04 NOTE — PROGRESS NOTES
PT DAILY TREATMENT NOTE - [facility] 2-15    Patient Name: Milka Holland  Date:2019  : 1940  [x]  Patient  Verified  Payor: Ariela Vargheseer / Plan: [place] MEDICARE PART A & B / Product Type: Medicare /    In time:11:00 AM Out time:11:50 AM  Total Treatment Time (min): 50  Total Timed Codes (min): 40  1:1 Treatment Time ( only): 50   Visit #:  2    Treatment Area: Neck pain [M54.2]  Left shoulder pain [M25.512]    SUBJECTIVE  Pain Level (0-10 scale): 1/10  Any medication changes, allergies to medications, adverse drug reactions, diagnosis change, or new procedure performed?: [x] No    [] Yes (see summary sheet for update)  Subjective functional status/changes:   [] No changes reported  Patient reports no adverse effect from last visit, but no significant improvement either. His left bicep feels very tight. OBJECTIVE             Modality rationale: decrease pain and increase tissue extensibility to improve the patients ability to raise his arm overhead without pain    Min Type Additional Details   10 [x]? Estim: []? Att   []? Unatt        []? TENS instruct                  []?IFC  []? Premod   []? NMES                     [x]? Other: asymmetrical, biphasic, 300 usec, 2 Hz []?w/US   []?w/ice   []?w/heat  Position: prone  Location:L upper trap, L C5 paraspinals     []? Traction: []? Cervical       []? Lumbar                       []? Prone          []? Supine                       []?Intermittent   []? Continuous Lbs:  []? before manual  []? after manual  []?w/heat     []? Ultrasound: []? Continuous   []? Pulsed at:                           []? 1MHz   []? 3MHz Location:  W/cm2:     []? Paraffin         Location:   []?w/heat     []? Ice     []? Heat  []? Ice massage Position:  Location:     []? Laser  []? Other: Position:  Location:        []? Vasopneumatic Device Pressure:       []? lo []? med []? hi   Temperature:       [x]? Skin assessment post-treatment:  [x]? intact []? redness- no adverse reaction    []? redness  adverse reaction:      15 min Therapeutic Exercise:  [x]? See flow sheet :   Rationale: increase ROM, increase strength and improve coordination to improve the patients ability to look over his shoulder without pain     25 min Manual Therapy: DN was performed in prone to the left levator scapulae at the cervical attachment (C4-C5), mid muscle belly,and at the scapular attachment. 4x 40 mm x .30 mm needles were used throughout with pistoning performed to elicit LTR's and e-stim utilized to allow greater activation of the DNIC pathway.     DN was performed prior to the following manual therapy techniques to allow for greater ROM and increased tissue extensibility:  STM to the L upper trapezius  Passive stretching of the L levator scapulae    Rationale: decrease pain, increase ROM, increase tissue extensibility and decrease trigger points to improve the patients ability to reach overhead without pain          With   [] TE   [] TA   [] neuro   [] other: Patient Education: [x] Review HEP    [] Progressed/Changed HEP based on:   [] positioning   [] body mechanics   [] transfers   [] heat/ice application    [] other:      Other Objective/Functional Measures:      Pain Level (0-10 scale) post treatment: 0    ASSESSMENT/Changes in Function:   Patient has not shown a significant change in function, but is tolerating PT well. Patient will continue to benefit from skilled PT services to modify and progress therapeutic interventions, address functional mobility deficits, address ROM deficits, address strength deficits, analyze and address soft tissue restrictions, analyze and cue movement patterns, analyze and modify body mechanics/ergonomics and assess and modify postural abnormalities to attain remaining goals. []  See Plan of Care  []  See progress note/recertification  []  See Discharge Summary         Progress towards goals / Updated goals:  No progress towards goals at this time.     PLAN  [x]  Upgrade activities as tolerated     [x]  Continue plan of care  []  Update interventions per flow sheet       []  Discharge due to:_  []  Other:_      Leena Navarro, PT , DPT, OCS, Cert.  DN   12/4/2019

## 2019-12-10 ENCOUNTER — HOSPITAL ENCOUNTER (OUTPATIENT)
Dept: PHYSICAL THERAPY | Age: 79
Discharge: HOME | End: 2019-12-10
Payer: MEDICARE

## 2019-12-10 PROCEDURE — 97110 THERAPEUTIC EXERCISES: CPT | Performed by: PHYSICAL THERAPIST

## 2019-12-10 NOTE — PROGRESS NOTES
PT DAILY TREATMENT NOTE - [facility] 2-15    Patient Name: Meryle Currier  Date:12/10/2019  : 1940  [x]  Patient  Verified  Payor: Rubin Bloch / Plan: [place] / Product Type: Medicare /    In time:6:00 PM Out time:6:50 PM  Total Treatment Time (min): 50  Total Timed Codes (min): 50  1:1 Treatment Time ( only): 30   Visit #:  3    Treatment Area: Neck pain [M54.2]  Left shoulder pain [M25.512]    SUBJECTIVE  Pain Level (0-10 scale): 0/10  Any medication changes, allergies to medications, adverse drug reactions, diagnosis change, or new procedure performed?: [x] No    [] Yes (see summary sheet for update)  Subjective functional status/changes:   [] No changes reported  Patient reports a full resolution of neck pain, but he continues to have weakness and a lack of endurance at his left bicep. He notices no improvement yet with his HEP. OBJECTIVE `     50 min Therapeutic Exercise:  [x]? See flow sheet :   Rationale: increase ROM, increase strength and improve coordination to improve the patients ability to look over his shoulder without pain      With   [] TE   [] TA   [] neuro   [] other: Patient Education: [x] Review HEP    [] Progressed/Changed HEP based on:   [] positioning   [] body mechanics   [] transfers   [] heat/ice application    [] other:      Other Objective/Functional Measures:    Progressed resistance with several exercises, including bicep curls    Pain Level (0-10 scale) post treatment: 0    ASSESSMENT/Changes in Function:   Continued weakness along C5/C6 myotomes, but able to perform all exercises today.   Patient will continue to benefit from skilled PT services to modify and progress therapeutic interventions, address functional mobility deficits, address ROM deficits, address strength deficits, analyze and address soft tissue restrictions, analyze and cue movement patterns, analyze and modify body mechanics/ergonomics and assess and modify postural abnormalities to attain remaining goals. []  See Plan of Care  []  See progress note/recertification  []  See Discharge Summary         Progress towards goals / Updated goals:  Slow overall progress at this time. PLAN  [x]  Upgrade activities as tolerated     [x]  Continue plan of care  []  Update interventions per flow sheet       []  Discharge due to:_  []  Other:_      Esteban Martinez, PT , DPT, OCS, Cert.  DN   12/10/2019

## 2019-12-11 ENCOUNTER — APPOINTMENT (OUTPATIENT)
Dept: PHYSICAL THERAPY | Age: 79
End: 2019-12-11
Payer: MEDICARE

## 2019-12-18 ENCOUNTER — HOSPITAL ENCOUNTER (OUTPATIENT)
Dept: PHYSICAL THERAPY | Age: 79
Discharge: HOME | End: 2019-12-18
Payer: MEDICARE

## 2019-12-18 PROCEDURE — 97110 THERAPEUTIC EXERCISES: CPT | Performed by: PHYSICAL THERAPIST

## 2019-12-18 NOTE — PROGRESS NOTES
PT DAILY TREATMENT NOTE - [facility] 2-15    Patient Name: Lanie Marie  Date:2019  : 1940  [x]  Patient  Verified  Payor: Noemy Prater / Plan: [place] / Product Type: Medicare /    In time:10:20 AM Out time:11:15 AM  Total Treatment Time (min): 55  Total Timed Codes (min): 55  1:1 Treatment Time ( only): 40   Visit #:  4    Treatment Area: Neck pain [M54.2]  Left shoulder pain [M25.512]    SUBJECTIVE  Pain Level (0-10 scale): 0/10  Any medication changes, allergies to medications, adverse drug reactions, diagnosis change, or new procedure performed?: [x] No    [] Yes (see summary sheet for update)  Subjective functional status/changes:   [] No changes reported  Patient reports no significant change yet at the L biceps strength. OBJECTIVE `     50 min Therapeutic Exercise:  [x]? See flow sheet :   Rationale: increase ROM, increase strength and improve coordination to improve the patients ability to look over his shoulder without pain      With   [] TE   [] TA   [] neuro   [] other: Patient Education: [x] Review HEP    [] Progressed/Changed HEP based on:   [] positioning   [] body mechanics   [] transfers   [] heat/ice application    [] other:      Other Objective/Functional Measures:    Significant fatigue observed at the L biceps during ther ex. Unable to complete 1st of 10. Pain Level (0-10 scale) post treatment: 0    ASSESSMENT/Changes in Function:   No change in function compared to last visit. Patient will continue to benefit from skilled PT services to modify and progress therapeutic interventions, address functional mobility deficits, address ROM deficits, address strength deficits, analyze and address soft tissue restrictions, analyze and cue movement patterns, analyze and modify body mechanics/ergonomics and assess and modify postural abnormalities to attain remaining goals.      []  See Plan of Care  []  See progress note/recertification  []  See Discharge Summary Progress towards goals / Updated goals:  Continued slow overall progress towards short term goals due to considerable weakness and poor stamina at the C5/C6 myotomal musculature. PLAN  [x]  Upgrade activities as tolerated     [x]  Continue plan of care  []  Update interventions per flow sheet       []  Discharge due to:_  []  Other:_      Alvin Venegas, PT , DPT, OCS, Cert.  DN   12/18/2019

## 2019-12-23 ENCOUNTER — APPOINTMENT (OUTPATIENT)
Dept: PHYSICAL THERAPY | Age: 79
End: 2019-12-23
Payer: MEDICARE

## 2020-01-06 ENCOUNTER — HOSPITAL ENCOUNTER (OUTPATIENT)
Dept: PHYSICAL THERAPY | Age: 80
Discharge: HOME | End: 2020-01-06
Payer: MEDICARE

## 2020-01-06 PROCEDURE — 97110 THERAPEUTIC EXERCISES: CPT | Performed by: PHYSICAL THERAPIST

## 2020-01-06 NOTE — PROGRESS NOTES
PT DAILY TREATMENT NOTE - [facility] 2-15    Patient Name: Dolores Roe  Date:2020  : 1940  [x]  Patient  Verified  Payor: Diogenes Sandoval / Plan: [place] MED[place] PART A [place] B / Product Type: Medicare /    In time:11:10 AM Out time:11:55 AM  Total Treatment Time (min): 45  Total Timed Codes (min): 45  1:1 Treatment Time ( [address] only): 45   Visit #:  5    Treatment Area: Neck pain [M54.2]  Left shoulder pain [M25.512]    SUBJECTIVE  Pain Level (0-10 scale): 0/10  Any medication changes, allergies to medications, adverse drug reactions, diagnosis change, or new procedure performed?: [x] No    [] Yes (see summary sheet for update)  Subjective functional status/changes:   [] No changes reported  Patient reports no significant change yet at the L biceps strength. He is independent in his HEP and feels ready for discharge. OBJECTIVE `     45 min Therapeutic Exercise:  [x]? See flow sheet :   Rationale: increase ROM, increase strength and improve coordination to improve the patients ability to look over his shoulder without pain      With   [] TE   [] TA   [] neuro   [] other: Patient Education: [x] Review HEP    [] Progressed/Changed HEP based on:   [] positioning   [] body mechanics   [] transfers   [] heat/ice application    [] other:      Other Objective/Functional Measures:      Pain Level (0-10 scale) post treatment: 0    ASSESSMENT/Changes in Function:   Patient will continue to benefit from skilled PT services to modify and progress therapeutic interventions, address functional mobility deficits, address ROM deficits, address strength deficits, analyze and address soft tissue restrictions, analyze and cue movement patterns, analyze and modify body mechanics/ergonomics and assess and modify postural abnormalities to attain remaining goals.      []  See Plan of Care  []  See progress note/recertification  [x]  See Discharge Summary           PLAN  [x]  Upgrade activities as tolerated     [x]  Continue plan of care  [] Update interventions per flow sheet       []  Discharge due to:_  []  Other:_      Cecilio Mckeon, PT , DPT, OCS, Cert.  DN   1/6/2020

## 2020-03-13 NOTE — PROGRESS NOTES
[address] [facility]. [place] [address] [facility], [address] [place] [address] Drive  Phone: [phone] Fax: [phone]      Discharge Summary 2-15    Patient name: Pillo Sanchez  : 1940  Provider#: [id number]  Referral source: Pari Langford MD      Medical/Treatment Diagnosis: Neck pain [M54.2]  Left shoulder pain [M25.512]     Prior Hospitalization: see medical history     Comorbidities: See Plan of Care  Prior Level of Function: See Plan of Care  Medications: Verified on Patient Summary List    Start of Care: 19      Onset Date:Gradual onset through    Visits from Start of Care: 5     Missed Visits: 0  Reporting Period : 19 to 2020    Assessment/Summary of care: Mr. Na Veronica did very well with an achievement of WNL cervical ROM and a complete resolution in neck and arm pain. He achieved all long term goals and no longer requires PT services. Short Term Goals: To be accomplished in 8 treatments:  1. Patient will be able to demonstrate >60 degrees of AROM cervical rotation in either direction with <2/10 neck pain reported. Met.  2. Patient will be able to demonstrate >120 degrees of AROM elevation with <2/10 neck pain reported. Met.  3. Patient will be able to demonstrate >4/5 biceps MMT strength to allow for greater ability to lift a bag of groceries from the floor. Met.  Long Term Goals: To be accomplished in 16 treatments:  1. Patient will be able to lift 1# from waist to chest height with the L UE.Met.  2. Patient will be able to demonstrate >50# of L  strength. Met.  3. Patient will be able to sleep through the night without being woken up by neck pain. Met.         RECOMMENDATIONS:  [x]Discontinue therapy: [x]Patient has reached or is progressing toward set goals     []Patient is non-compliant or has abdicated     []Due to lack of appreciable progress towards set goals     []Other  Lisa Gross, PT 3/13/2020